# Patient Record
Sex: FEMALE | Race: WHITE | NOT HISPANIC OR LATINO | Employment: FULL TIME | URBAN - METROPOLITAN AREA
[De-identification: names, ages, dates, MRNs, and addresses within clinical notes are randomized per-mention and may not be internally consistent; named-entity substitution may affect disease eponyms.]

---

## 2019-04-05 ENCOUNTER — HOSPITAL ENCOUNTER (EMERGENCY)
Facility: HOSPITAL | Age: 19
Discharge: HOME | End: 2019-04-05
Attending: EMERGENCY MEDICINE
Payer: COMMERCIAL

## 2019-04-05 VITALS
BODY MASS INDEX: 19.16 KG/M2 | WEIGHT: 115 LBS | TEMPERATURE: 98 F | DIASTOLIC BLOOD PRESSURE: 79 MMHG | HEIGHT: 65 IN | OXYGEN SATURATION: 100 % | HEART RATE: 88 BPM | SYSTOLIC BLOOD PRESSURE: 130 MMHG | RESPIRATION RATE: 20 BRPM

## 2019-04-05 DIAGNOSIS — R11.2 NON-INTRACTABLE VOMITING WITH NAUSEA, UNSPECIFIED VOMITING TYPE: Primary | ICD-10-CM

## 2019-04-05 DIAGNOSIS — R10.84 GENERALIZED ABDOMINAL PAIN: ICD-10-CM

## 2019-04-05 LAB
ALBUMIN SERPL BCP-MCNC: 4.3 G/DL (ref 3.5–5.2)
ALP SERPL-CCNC: 45 U/L (ref 55–135)
ALT SERPL W/O P-5'-P-CCNC: 17 U/L (ref 10–44)
AMPHET+METHAMPHET UR QL: NEGATIVE
ANION GAP SERPL CALC-SCNC: 14 MMOL/L (ref 8–16)
AST SERPL-CCNC: 25 U/L (ref 10–40)
B-HCG UR QL: NEGATIVE
BACTERIA #/AREA URNS HPF: ABNORMAL /HPF
BARBITURATES UR QL SCN>200 NG/ML: NEGATIVE
BASOPHILS # BLD AUTO: 0.01 K/UL (ref 0–0.2)
BASOPHILS NFR BLD: 0.1 % (ref 0–1.9)
BENZODIAZ UR QL SCN>200 NG/ML: NEGATIVE
BILIRUB SERPL-MCNC: 0.7 MG/DL (ref 0.1–1)
BILIRUB UR QL STRIP: NEGATIVE
BUN SERPL-MCNC: 8 MG/DL (ref 6–20)
BZE UR QL SCN: NEGATIVE
CALCIUM SERPL-MCNC: 9.8 MG/DL (ref 8.7–10.5)
CANNABINOIDS UR QL SCN: NEGATIVE
CHLORIDE SERPL-SCNC: 103 MMOL/L (ref 95–110)
CLARITY UR: ABNORMAL
CO2 SERPL-SCNC: 20 MMOL/L (ref 23–29)
COLOR UR: YELLOW
CREAT SERPL-MCNC: 0.8 MG/DL (ref 0.5–1.4)
CREAT UR-MCNC: 175.9 MG/DL (ref 15–325)
CTP QC/QA: YES
DIFFERENTIAL METHOD: ABNORMAL
EOSINOPHIL # BLD AUTO: 0 K/UL (ref 0–0.5)
EOSINOPHIL NFR BLD: 0 % (ref 0–8)
ERYTHROCYTE [DISTWIDTH] IN BLOOD BY AUTOMATED COUNT: 11.7 % (ref 11.5–14.5)
EST. GFR  (AFRICAN AMERICAN): >60 ML/MIN/1.73 M^2
EST. GFR  (NON AFRICAN AMERICAN): >60 ML/MIN/1.73 M^2
GLUCOSE SERPL-MCNC: 131 MG/DL (ref 70–110)
GLUCOSE UR QL STRIP: NEGATIVE
HCT VFR BLD AUTO: 39.3 % (ref 37–48.5)
HGB BLD-MCNC: 13.4 G/DL (ref 12–16)
HGB UR QL STRIP: ABNORMAL
HYALINE CASTS #/AREA URNS LPF: 0 /LPF
KETONES UR QL STRIP: ABNORMAL
LEUKOCYTE ESTERASE UR QL STRIP: NEGATIVE
LIPASE SERPL-CCNC: 13 U/L (ref 4–60)
LYMPHOCYTES # BLD AUTO: 0.7 K/UL (ref 1–4.8)
LYMPHOCYTES NFR BLD: 6.2 % (ref 18–48)
MCH RBC QN AUTO: 31.1 PG (ref 27–31)
MCHC RBC AUTO-ENTMCNC: 34.1 G/DL (ref 32–36)
MCV RBC AUTO: 91 FL (ref 82–98)
METHADONE UR QL SCN>300 NG/ML: NEGATIVE
MICROSCOPIC COMMENT: ABNORMAL
MONOCYTES # BLD AUTO: 0.2 K/UL (ref 0.3–1)
MONOCYTES NFR BLD: 1.5 % (ref 4–15)
NEUTROPHILS # BLD AUTO: 10.6 K/UL (ref 1.8–7.7)
NEUTROPHILS NFR BLD: 92.1 % (ref 38–73)
NITRITE UR QL STRIP: NEGATIVE
OPIATES UR QL SCN: NEGATIVE
PCP UR QL SCN>25 NG/ML: NEGATIVE
PH UR STRIP: >8 [PH] (ref 5–8)
PLATELET # BLD AUTO: 305 K/UL (ref 150–350)
PMV BLD AUTO: 9.8 FL (ref 9.2–12.9)
POTASSIUM SERPL-SCNC: 3.4 MMOL/L (ref 3.5–5.1)
PROT SERPL-MCNC: 7.7 G/DL (ref 6–8.4)
PROT UR QL STRIP: ABNORMAL
RBC # BLD AUTO: 4.31 M/UL (ref 4–5.4)
RBC #/AREA URNS HPF: 2 /HPF (ref 0–4)
SODIUM SERPL-SCNC: 137 MMOL/L (ref 136–145)
SP GR UR STRIP: 1.02 (ref 1–1.03)
TOXICOLOGY INFORMATION: NORMAL
URN SPEC COLLECT METH UR: ABNORMAL
UROBILINOGEN UR STRIP-ACNC: NEGATIVE EU/DL
WBC # BLD AUTO: 11.5 K/UL (ref 3.9–12.7)
WBC #/AREA URNS HPF: 5 /HPF (ref 0–5)

## 2019-04-05 PROCEDURE — 25000003 PHARM REV CODE 250: Performed by: PHYSICIAN ASSISTANT

## 2019-04-05 PROCEDURE — 81025 URINE PREGNANCY TEST: CPT | Performed by: PHYSICIAN ASSISTANT

## 2019-04-05 PROCEDURE — 63600175 PHARM REV CODE 636 W HCPCS: Performed by: PHYSICIAN ASSISTANT

## 2019-04-05 PROCEDURE — 96361 HYDRATE IV INFUSION ADD-ON: CPT

## 2019-04-05 PROCEDURE — 80053 COMPREHEN METABOLIC PANEL: CPT

## 2019-04-05 PROCEDURE — 85025 COMPLETE CBC W/AUTO DIFF WBC: CPT

## 2019-04-05 PROCEDURE — C9113 INJ PANTOPRAZOLE SODIUM, VIA: HCPCS | Performed by: PHYSICIAN ASSISTANT

## 2019-04-05 PROCEDURE — 81000 URINALYSIS NONAUTO W/SCOPE: CPT | Mod: 59

## 2019-04-05 PROCEDURE — 96375 TX/PRO/DX INJ NEW DRUG ADDON: CPT

## 2019-04-05 PROCEDURE — 96365 THER/PROPH/DIAG IV INF INIT: CPT

## 2019-04-05 PROCEDURE — 80307 DRUG TEST PRSMV CHEM ANLYZR: CPT

## 2019-04-05 PROCEDURE — 83690 ASSAY OF LIPASE: CPT

## 2019-04-05 PROCEDURE — 99284 EMERGENCY DEPT VISIT MOD MDM: CPT | Mod: 25

## 2019-04-05 RX ORDER — PANTOPRAZOLE SODIUM 40 MG/10ML
40 INJECTION, POWDER, LYOPHILIZED, FOR SOLUTION INTRAVENOUS
Status: COMPLETED | OUTPATIENT
Start: 2019-04-05 | End: 2019-04-05

## 2019-04-05 RX ORDER — METOCLOPRAMIDE HYDROCHLORIDE 5 MG/ML
10 INJECTION INTRAMUSCULAR; INTRAVENOUS
Status: COMPLETED | OUTPATIENT
Start: 2019-04-05 | End: 2019-04-05

## 2019-04-05 RX ORDER — DICYCLOMINE HYDROCHLORIDE 10 MG/1
10 CAPSULE ORAL
Status: DISCONTINUED | OUTPATIENT
Start: 2019-04-05 | End: 2019-04-05

## 2019-04-05 RX ORDER — PROMETHAZINE HYDROCHLORIDE 25 MG/1
25 SUPPOSITORY RECTAL EVERY 6 HOURS PRN
Qty: 30 SUPPOSITORY | Refills: 0 | Status: ON HOLD | OUTPATIENT
Start: 2019-04-05 | End: 2019-04-09 | Stop reason: HOSPADM

## 2019-04-05 RX ORDER — PROMETHAZINE HYDROCHLORIDE 25 MG/ML
12.5 INJECTION, SOLUTION INTRAMUSCULAR; INTRAVENOUS
Status: DISCONTINUED | OUTPATIENT
Start: 2019-04-05 | End: 2019-04-05

## 2019-04-05 RX ORDER — ONDANSETRON 2 MG/ML
4 INJECTION INTRAMUSCULAR; INTRAVENOUS
Status: COMPLETED | OUTPATIENT
Start: 2019-04-05 | End: 2019-04-05

## 2019-04-05 RX ORDER — HYOSCYAMINE SULFATE 0.125 MG
0.38 TABLET ORAL
Status: COMPLETED | OUTPATIENT
Start: 2019-04-05 | End: 2019-04-05

## 2019-04-05 RX ORDER — HYOSCYAMINE SULFATE 0.5 MG/ML
0.5 INJECTION, SOLUTION SUBCUTANEOUS
Status: DISCONTINUED | OUTPATIENT
Start: 2019-04-05 | End: 2019-04-05

## 2019-04-05 RX ORDER — ONDANSETRON 4 MG/1
4 TABLET, ORALLY DISINTEGRATING ORAL EVERY 6 HOURS PRN
Qty: 30 TABLET | Refills: 0 | Status: SHIPPED | OUTPATIENT
Start: 2019-04-05

## 2019-04-05 RX ORDER — PROMETHAZINE HYDROCHLORIDE 25 MG/ML
12.5 INJECTION, SOLUTION INTRAMUSCULAR; INTRAVENOUS
Status: DISCONTINUED | OUTPATIENT
Start: 2019-04-05 | End: 2019-04-05 | Stop reason: HOSPADM

## 2019-04-05 RX ORDER — POTASSIUM CHLORIDE 20 MEQ/1
40 TABLET, EXTENDED RELEASE ORAL
Status: COMPLETED | OUTPATIENT
Start: 2019-04-05 | End: 2019-04-05

## 2019-04-05 RX ADMIN — POTASSIUM CHLORIDE 40 MEQ: 20 TABLET, EXTENDED RELEASE ORAL at 03:04

## 2019-04-05 RX ADMIN — ONDANSETRON 4 MG: 2 INJECTION INTRAMUSCULAR; INTRAVENOUS at 11:04

## 2019-04-05 RX ADMIN — SODIUM CHLORIDE 1000 ML: 0.9 INJECTION, SOLUTION INTRAVENOUS at 12:04

## 2019-04-05 RX ADMIN — SODIUM CHLORIDE 1000 ML: 0.9 INJECTION, SOLUTION INTRAVENOUS at 11:04

## 2019-04-05 RX ADMIN — PROMETHAZINE HYDROCHLORIDE 12.5 MG: 25 INJECTION INTRAMUSCULAR; INTRAVENOUS at 01:04

## 2019-04-05 RX ADMIN — PANTOPRAZOLE SODIUM 40 MG: 40 INJECTION, POWDER, LYOPHILIZED, FOR SOLUTION INTRAVENOUS at 11:04

## 2019-04-05 RX ADMIN — METOCLOPRAMIDE 10 MG: 5 INJECTION, SOLUTION INTRAMUSCULAR; INTRAVENOUS at 03:04

## 2019-04-05 RX ADMIN — HYOSCYAMINE SULFATE 0.38 MG: 0.12 TABLET ORAL at 02:04

## 2019-04-05 NOTE — ED NOTES
Pt presents to the ED w/ c/ of abd pain with n/v. Pt reports that she was admitted to the hospital 2 months ago for same symptoms. Pt reports that she has been having n/v abd pain for the past few weeks but reports the symptoms worsened last night. Pt reports intermittent nausea and epigastric pain at this time. Pt is actively vomiting at this time. Pt appears in mild distress.

## 2019-04-05 NOTE — ED PROVIDER NOTES
Encounter Date: 4/5/2019       History     Chief Complaint   Patient presents with    Emesis     intermittent n/v x 2 months. reports that she has seen GI with out resolution or dx     Niru Phillips, a 18 y.o. female  has no past medical history on file.     She presents to the ED for evaluation of N/V that started this morning.  Patient states that she's had this issue off and on for the last month.  She is from [place] and is in [facility] temporarily.  She states that she was initially seen in the ED in [place] for this issue where she did have f/u with a GI specialist.  A CT, US, endoscopy and upper GI series were performed that did not yield much results.  Patient was instructed to take Reglan, Ranitidine, and bentyl as well as an anti-anxiety medication, which she has not taken with consistency.  She denies any abdominal surgeries.  Attests to intermittent THC use.  No treatments tried with this episode of vomiting.  Denies any recent exotic travel.         The history is provided by the patient.     Review of patient's allergies indicates:  No Known Allergies  No past medical history on file.  No past surgical history on file.  No family history on file.  Social History     Tobacco Use    Smoking status: Not on file   Substance Use Topics    Alcohol use: Not on file    Drug use: Not on file     Review of Systems   Constitutional: Negative for fever.   Gastrointestinal: Positive for abdominal pain, nausea and vomiting. Negative for constipation and diarrhea.   Genitourinary: Negative for dysuria.   Skin: Negative for color change.   Neurological: Positive for weakness.   All other systems reviewed and are negative.      Physical Exam     Initial Vitals [04/05/19 0943]   BP Pulse Resp Temp SpO2   (!) 145/74 104 19 98.5 °F (36.9 °C) 100 %      MAP       --         Physical Exam    Nursing note and vitals reviewed.  Constitutional: She appears well-developed and well-nourished. She is not diaphoretic. No  distress.   HENT:   Head: Normocephalic and atraumatic.   Right Ear: External ear normal.   Left Ear: External ear normal.   Nose: Nose normal.   Mouth/Throat: Oropharynx is clear and moist.   Eyes: Conjunctivae and EOM are normal.   Neck: Normal range of motion.   Cardiovascular: Normal rate and regular rhythm.   Pulmonary/Chest: Breath sounds normal. No respiratory distress. She has no wheezes. She has no rhonchi. She has no rales.   Abdominal: Soft. Bowel sounds are normal. She exhibits no distension. There is generalized tenderness. There is no rigidity, no rebound, no guarding, no CVA tenderness, no tenderness at McBurney's point and negative King's sign.   Musculoskeletal: Normal range of motion.   Neurological: She is alert and oriented to person, place, and time.   Skin: Skin is warm and dry. Capillary refill takes less than 2 seconds.   Psychiatric: She has a normal mood and affect. Thought content normal.         ED Course   Procedures  Labs Reviewed   URINALYSIS, REFLEX TO URINE CULTURE - Abnormal; Notable for the following components:       Result Value    Appearance, UA Cloudy (*)     pH, UA >8.0 (*)     Protein, UA 2+ (*)     Ketones, UA 3+ (*)     Occult Blood UA Trace (*)     All other components within normal limits    Narrative:     Preferred Collection Type->Urine, Clean Catch   CBC W/ AUTO DIFFERENTIAL - Abnormal; Notable for the following components:    MCH 31.1 (*)     Gran # (ANC) 10.6 (*)     Lymph # 0.7 (*)     Mono # 0.2 (*)     Gran% 92.1 (*)     Lymph% 6.2 (*)     Mono% 1.5 (*)     All other components within normal limits   COMPREHENSIVE METABOLIC PANEL - Abnormal; Notable for the following components:    Potassium 3.4 (*)     CO2 20 (*)     Glucose 131 (*)     Alkaline Phosphatase 45 (*)     All other components within normal limits   URINALYSIS MICROSCOPIC - Abnormal; Notable for the following components:    Bacteria, UA Few (*)     All other components within normal limits     Narrative:     Preferred Collection Type->Urine, Clean Catch   DRUG SCREEN PANEL, URINE EMERGENCY    Narrative:     Preferred Collection Type->Urine, Clean Catch   LIPASE   POCT URINE PREGNANCY          Imaging Results    None          Medical Decision Making:   Initial Assessment:   N/V, generalized abdominal pain   Differential Diagnosis:   Dehydration, electrolyte abnormality, THC abuse, cyclical vomiting symdrome   Clinical Tests:   Lab Tests: Ordered and Reviewed  The following lab test(s) were unremarkable: CBC, CMP, Lipase and Urinalysis  ED Management:  Patient presents to the ER for evaluation of multiple episodes of vomiting as well as generalized abdominal pain.  Patient has had several episodes of this in the past and has a complete workup performed at her hospital in [place].  Abdomen shows generalized tenderness to palpation with no evidence of peritoneal signs.  No elevation of white cell count on CBC.  CMP shows potassium at 3.4.  Zofran, fluids, Phenergan were given.  Patient has not continued with vomiting with this intervention however she states she is still nauseated.  Reglan was given as well as p.o. Levsin.  Patient did past her p.o. challenge.  She will be prescribed a course of Phenergan suppositories as well as ODT Zofran.  Strict return precautions were discussed with her and her parents including the vomiting through medication, fever, increased abdominal pain.  Both patient and.  Verbalized understanding and agreement with plan.  Case discussed with supervising physician who agrees with plan.                        Clinical Impression:       ICD-10-CM ICD-9-CM   1. Non-intractable vomiting with nausea, unspecified vomiting type R11.2 787.01   2. Generalized abdominal pain R10.84 789.07                                Selina Lema PA-C  04/05/19 6290

## 2019-04-05 NOTE — ED NOTES
"Pt states "ate a little piece of roxie cracker." pt did not take PO potassium pills. RN encouraged to try PO challenge. Will continue to monitor  "

## 2019-04-06 ENCOUNTER — HOSPITAL ENCOUNTER (OUTPATIENT)
Facility: HOSPITAL | Age: 19
Discharge: HOME | End: 2019-04-09
Attending: EMERGENCY MEDICINE | Admitting: INTERNAL MEDICINE
Payer: COMMERCIAL

## 2019-04-06 DIAGNOSIS — R11.2 INTRACTABLE NAUSEA AND VOMITING: Primary | ICD-10-CM

## 2019-04-06 DIAGNOSIS — Z91.89 AT RISK FOR PROLONGED QT INTERVAL SYNDROME: ICD-10-CM

## 2019-04-06 DIAGNOSIS — E87.6 HYPOKALEMIA: ICD-10-CM

## 2019-04-06 DIAGNOSIS — R10.9 ABDOMINAL PAIN, UNSPECIFIED ABDOMINAL LOCATION: ICD-10-CM

## 2019-04-06 LAB
ALBUMIN SERPL BCP-MCNC: 4.3 G/DL (ref 3.5–5.2)
ALP SERPL-CCNC: 42 U/L (ref 55–135)
ALT SERPL W/O P-5'-P-CCNC: 20 U/L (ref 10–44)
AMPHET+METHAMPHET UR QL: NEGATIVE
ANION GAP SERPL CALC-SCNC: 15 MMOL/L (ref 8–16)
AST SERPL-CCNC: 26 U/L (ref 10–40)
B-HCG UR QL: NEGATIVE
BACTERIA #/AREA URNS AUTO: NORMAL /HPF
BARBITURATES UR QL SCN>200 NG/ML: NEGATIVE
BASOPHILS # BLD AUTO: 0.01 K/UL (ref 0–0.2)
BASOPHILS NFR BLD: 0.1 % (ref 0–1.9)
BENZODIAZ UR QL SCN>200 NG/ML: NEGATIVE
BILIRUB SERPL-MCNC: 0.8 MG/DL (ref 0.1–1)
BILIRUB UR QL STRIP: NEGATIVE
BUN SERPL-MCNC: 8 MG/DL (ref 6–20)
BZE UR QL SCN: NEGATIVE
CALCIUM SERPL-MCNC: 9.6 MG/DL (ref 8.7–10.5)
CANNABINOIDS UR QL SCN: NEGATIVE
CHLORIDE SERPL-SCNC: 104 MMOL/L (ref 95–110)
CLARITY UR REFRACT.AUTO: ABNORMAL
CO2 SERPL-SCNC: 19 MMOL/L (ref 23–29)
COLOR UR AUTO: YELLOW
CREAT SERPL-MCNC: 0.9 MG/DL (ref 0.5–1.4)
CREAT UR-MCNC: 127 MG/DL (ref 15–325)
CRP SERPL-MCNC: 1.2 MG/L (ref 0–8.2)
CTP QC/QA: YES
DIFFERENTIAL METHOD: ABNORMAL
EOSINOPHIL # BLD AUTO: 0 K/UL (ref 0–0.5)
EOSINOPHIL NFR BLD: 0 % (ref 0–8)
ERYTHROCYTE [DISTWIDTH] IN BLOOD BY AUTOMATED COUNT: 11.8 % (ref 11.5–14.5)
ERYTHROCYTE [SEDIMENTATION RATE] IN BLOOD BY WESTERGREN METHOD: 15 MM/HR (ref 0–36)
EST. GFR  (AFRICAN AMERICAN): >60 ML/MIN/1.73 M^2
EST. GFR  (NON AFRICAN AMERICAN): >60 ML/MIN/1.73 M^2
GLUCOSE SERPL-MCNC: 107 MG/DL (ref 70–110)
GLUCOSE UR QL STRIP: NEGATIVE
HCT VFR BLD AUTO: 39.9 % (ref 37–48.5)
HGB BLD-MCNC: 13.6 G/DL (ref 12–16)
HGB UR QL STRIP: ABNORMAL
HYALINE CASTS UR QL AUTO: 0 /LPF
IMM GRANULOCYTES # BLD AUTO: 0.04 K/UL (ref 0–0.04)
IMM GRANULOCYTES NFR BLD AUTO: 0.3 % (ref 0–0.5)
KETONES UR QL STRIP: ABNORMAL
LEUKOCYTE ESTERASE UR QL STRIP: NEGATIVE
LIPASE SERPL-CCNC: 26 U/L (ref 4–60)
LYMPHOCYTES # BLD AUTO: 1.3 K/UL (ref 1–4.8)
LYMPHOCYTES NFR BLD: 9.4 % (ref 18–48)
MCH RBC QN AUTO: 31.7 PG (ref 27–31)
MCHC RBC AUTO-ENTMCNC: 34.1 G/DL (ref 32–36)
MCV RBC AUTO: 93 FL (ref 82–98)
METHADONE UR QL SCN>300 NG/ML: NEGATIVE
MICROSCOPIC COMMENT: NORMAL
MONOCYTES # BLD AUTO: 0.4 K/UL (ref 0.3–1)
MONOCYTES NFR BLD: 3.1 % (ref 4–15)
NEUTROPHILS # BLD AUTO: 12.4 K/UL (ref 1.8–7.7)
NEUTROPHILS NFR BLD: 87.1 % (ref 38–73)
NITRITE UR QL STRIP: NEGATIVE
NRBC BLD-RTO: 0 /100 WBC
OPIATES UR QL SCN: NEGATIVE
PCP UR QL SCN>25 NG/ML: NEGATIVE
PH UR STRIP: 7 [PH] (ref 5–8)
PLATELET # BLD AUTO: 330 K/UL (ref 150–350)
PMV BLD AUTO: 9.8 FL (ref 9.2–12.9)
POTASSIUM SERPL-SCNC: 3.3 MMOL/L (ref 3.5–5.1)
PROT SERPL-MCNC: 7.5 G/DL (ref 6–8.4)
PROT UR QL STRIP: ABNORMAL
RBC # BLD AUTO: 4.29 M/UL (ref 4–5.4)
RBC #/AREA URNS AUTO: 3 /HPF (ref 0–4)
SODIUM SERPL-SCNC: 138 MMOL/L (ref 136–145)
SP GR UR STRIP: 1.01 (ref 1–1.03)
SQUAMOUS #/AREA URNS AUTO: 5 /HPF
TOXICOLOGY INFORMATION: NORMAL
URN SPEC COLLECT METH UR: ABNORMAL
WBC # BLD AUTO: 14.27 K/UL (ref 3.9–12.7)
WBC #/AREA URNS AUTO: 1 /HPF (ref 0–5)

## 2019-04-06 PROCEDURE — 80307 DRUG TEST PRSMV CHEM ANLYZR: CPT

## 2019-04-06 PROCEDURE — 99220 PR INITIAL OBSERVATION CARE,LEVL III: ICD-10-PCS | Mod: ,,, | Performed by: PHYSICIAN ASSISTANT

## 2019-04-06 PROCEDURE — S0028 INJECTION, FAMOTIDINE, 20 MG: HCPCS | Performed by: PHYSICIAN ASSISTANT

## 2019-04-06 PROCEDURE — 96361 HYDRATE IV INFUSION ADD-ON: CPT

## 2019-04-06 PROCEDURE — 99285 EMERGENCY DEPT VISIT HI MDM: CPT | Mod: ,,, | Performed by: PHYSICIAN ASSISTANT

## 2019-04-06 PROCEDURE — 25000003 PHARM REV CODE 250: Performed by: PHYSICIAN ASSISTANT

## 2019-04-06 PROCEDURE — 81025 URINE PREGNANCY TEST: CPT | Performed by: PHYSICIAN ASSISTANT

## 2019-04-06 PROCEDURE — 99285 EMERGENCY DEPT VISIT HI MDM: CPT

## 2019-04-06 PROCEDURE — G0378 HOSPITAL OBSERVATION PER HR: HCPCS

## 2019-04-06 PROCEDURE — 99285 PR EMERGENCY DEPT VISIT,LEVEL V: ICD-10-PCS | Mod: ,,, | Performed by: PHYSICIAN ASSISTANT

## 2019-04-06 PROCEDURE — 83690 ASSAY OF LIPASE: CPT

## 2019-04-06 PROCEDURE — 80053 COMPREHEN METABOLIC PANEL: CPT

## 2019-04-06 PROCEDURE — 86140 C-REACTIVE PROTEIN: CPT

## 2019-04-06 PROCEDURE — 96365 THER/PROPH/DIAG IV INF INIT: CPT

## 2019-04-06 PROCEDURE — 85652 RBC SED RATE AUTOMATED: CPT

## 2019-04-06 PROCEDURE — 99220 PR INITIAL OBSERVATION CARE,LEVL III: CPT | Mod: ,,, | Performed by: PHYSICIAN ASSISTANT

## 2019-04-06 PROCEDURE — 81001 URINALYSIS AUTO W/SCOPE: CPT

## 2019-04-06 PROCEDURE — 85025 COMPLETE CBC W/AUTO DIFF WBC: CPT

## 2019-04-06 PROCEDURE — 63600175 PHARM REV CODE 636 W HCPCS: Performed by: PHYSICIAN ASSISTANT

## 2019-04-06 PROCEDURE — 96375 TX/PRO/DX INJ NEW DRUG ADDON: CPT

## 2019-04-06 RX ORDER — ONDANSETRON 2 MG/ML
4 INJECTION INTRAMUSCULAR; INTRAVENOUS
Status: COMPLETED | OUTPATIENT
Start: 2019-04-06 | End: 2019-04-06

## 2019-04-06 RX ORDER — CALCIUM CARBONATE 200(500)MG
1000 TABLET,CHEWABLE ORAL 3 TIMES DAILY PRN
Status: DISCONTINUED | OUTPATIENT
Start: 2019-04-06 | End: 2019-04-09 | Stop reason: HOSPADM

## 2019-04-06 RX ORDER — ONDANSETRON 8 MG/1
8 TABLET, ORALLY DISINTEGRATING ORAL EVERY 8 HOURS PRN
Status: DISCONTINUED | OUTPATIENT
Start: 2019-04-06 | End: 2019-04-09 | Stop reason: HOSPADM

## 2019-04-06 RX ORDER — DICYCLOMINE HYDROCHLORIDE 10 MG/1
20 CAPSULE ORAL
Status: DISPENSED | OUTPATIENT
Start: 2019-04-06 | End: 2019-04-07

## 2019-04-06 RX ORDER — SODIUM CHLORIDE 0.9 % (FLUSH) 0.9 %
10 SYRINGE (ML) INJECTION
Status: DISCONTINUED | OUTPATIENT
Start: 2019-04-06 | End: 2019-04-09 | Stop reason: HOSPADM

## 2019-04-06 RX ORDER — DEXTROSE MONOHYDRATE, SODIUM CHLORIDE, AND POTASSIUM CHLORIDE 50; 2.98; 4.5 G/1000ML; G/1000ML; G/1000ML
INJECTION, SOLUTION INTRAVENOUS CONTINUOUS
Status: DISPENSED | OUTPATIENT
Start: 2019-04-06 | End: 2019-04-07

## 2019-04-06 RX ORDER — BISACODYL 10 MG
10 SUPPOSITORY, RECTAL RECTAL DAILY PRN
Status: DISCONTINUED | OUTPATIENT
Start: 2019-04-06 | End: 2019-04-09 | Stop reason: HOSPADM

## 2019-04-06 RX ORDER — SODIUM CHLORIDE 0.9 % (FLUSH) 0.9 %
5 SYRINGE (ML) INJECTION
Status: DISCONTINUED | OUTPATIENT
Start: 2019-04-06 | End: 2019-04-09 | Stop reason: HOSPADM

## 2019-04-06 RX ORDER — KETOROLAC TROMETHAMINE 30 MG/ML
10 INJECTION, SOLUTION INTRAMUSCULAR; INTRAVENOUS
Status: COMPLETED | OUTPATIENT
Start: 2019-04-06 | End: 2019-04-06

## 2019-04-06 RX ORDER — IPRATROPIUM BROMIDE AND ALBUTEROL SULFATE 2.5; .5 MG/3ML; MG/3ML
3 SOLUTION RESPIRATORY (INHALATION) EVERY 4 HOURS PRN
Status: DISCONTINUED | OUTPATIENT
Start: 2019-04-06 | End: 2019-04-09 | Stop reason: HOSPADM

## 2019-04-06 RX ORDER — GLUCAGON 1 MG
1 KIT INJECTION
Status: DISCONTINUED | OUTPATIENT
Start: 2019-04-06 | End: 2019-04-09 | Stop reason: HOSPADM

## 2019-04-06 RX ORDER — RAMELTEON 8 MG/1
8 TABLET ORAL NIGHTLY PRN
Status: DISCONTINUED | OUTPATIENT
Start: 2019-04-06 | End: 2019-04-09 | Stop reason: HOSPADM

## 2019-04-06 RX ORDER — POLYETHYLENE GLYCOL 3350 17 G/17G
17 POWDER, FOR SOLUTION ORAL DAILY
Status: DISCONTINUED | OUTPATIENT
Start: 2019-04-07 | End: 2019-04-07

## 2019-04-06 RX ORDER — IBUPROFEN 200 MG
16 TABLET ORAL
Status: DISCONTINUED | OUTPATIENT
Start: 2019-04-06 | End: 2019-04-09 | Stop reason: HOSPADM

## 2019-04-06 RX ORDER — PANTOPRAZOLE SODIUM 40 MG/1
40 TABLET, DELAYED RELEASE ORAL DAILY
Status: DISCONTINUED | OUTPATIENT
Start: 2019-04-07 | End: 2019-04-09 | Stop reason: HOSPADM

## 2019-04-06 RX ORDER — METOCLOPRAMIDE HYDROCHLORIDE 5 MG/5ML
10 SOLUTION ORAL
Status: DISCONTINUED | OUTPATIENT
Start: 2019-04-06 | End: 2019-04-09 | Stop reason: HOSPADM

## 2019-04-06 RX ORDER — IBUPROFEN 200 MG
24 TABLET ORAL
Status: DISCONTINUED | OUTPATIENT
Start: 2019-04-06 | End: 2019-04-09 | Stop reason: HOSPADM

## 2019-04-06 RX ORDER — ACETAMINOPHEN 325 MG/1
650 TABLET ORAL EVERY 4 HOURS PRN
Status: DISCONTINUED | OUTPATIENT
Start: 2019-04-06 | End: 2019-04-09 | Stop reason: HOSPADM

## 2019-04-06 RX ORDER — FAMOTIDINE 10 MG/ML
20 INJECTION INTRAVENOUS
Status: COMPLETED | OUTPATIENT
Start: 2019-04-06 | End: 2019-04-06

## 2019-04-06 RX ORDER — KETOROLAC TROMETHAMINE 30 MG/ML
15 INJECTION, SOLUTION INTRAMUSCULAR; INTRAVENOUS EVERY 6 HOURS PRN
Status: DISCONTINUED | OUTPATIENT
Start: 2019-04-06 | End: 2019-04-08

## 2019-04-06 RX ADMIN — SODIUM CHLORIDE 1000 ML: 0.9 INJECTION, SOLUTION INTRAVENOUS at 02:04

## 2019-04-06 RX ADMIN — SODIUM CHLORIDE 1000 ML: 0.9 INJECTION, SOLUTION INTRAVENOUS at 03:04

## 2019-04-06 RX ADMIN — PROMETHAZINE HYDROCHLORIDE 12.5 MG: 25 INJECTION INTRAMUSCULAR; INTRAVENOUS at 05:04

## 2019-04-06 RX ADMIN — KETOROLAC TROMETHAMINE 10 MG: 30 INJECTION, SOLUTION INTRAMUSCULAR at 03:04

## 2019-04-06 RX ADMIN — FAMOTIDINE 20 MG: 10 INJECTION, SOLUTION INTRAVENOUS at 04:04

## 2019-04-06 RX ADMIN — ONDANSETRON 4 MG: 2 INJECTION INTRAMUSCULAR; INTRAVENOUS at 03:04

## 2019-04-06 RX ADMIN — METOCLOPRAMIDE HYDROCHLORIDE 10 MG: 5 SOLUTION ORAL at 10:04

## 2019-04-06 RX ADMIN — POTASSIUM CHLORIDE, DEXTROSE MONOHYDRATE AND SODIUM CHLORIDE: 300; 5; 450 INJECTION, SOLUTION INTRAVENOUS at 10:04

## 2019-04-06 NOTE — ED TRIAGE NOTES
N/Vx 2 days pt was scene at [facility] yesterday. Pt c\o upper abd pain. And d/c with phenergan suppositories with no relief.

## 2019-04-06 NOTE — ED NOTES
Niru Phillips, a 18 y.o. female presents to the ED via pmv with CC N/V      Patient identifiers verified verbally with armband and correct for Niru Phillips.    LOC/ APPEARANCE: The patient is AAOx4. Pt is speaking appropriately, no slurred speech. Pt changed into hospital gown. Continuous cardiac monitor, cont pulse ox, and auto BP cuff applied to patient. Pt is clean and well groomed. No JVD visible. Pt reports pain level of 0. Pt updated on POC. Bed low and locked with side rails up x2, call bell in pt reach.  SKIN: Skin is warm dry and intact, and color is consistent with ethnicity. Capillary refill <3 seconds. No breakdown or brusing visible. Mucus membranes moist, acyanotic.  RESPIRATORY: Airway is open and patent. Respirations-spontaneous, unlabored, regular rate, equal bilaterally on inspiration and expiration. No accessory muscle use noted. Lungs clear to auscultation in all fields bilaterally anterior and posterior.   CARDIAC: Patient has regular heart rate.  No peripheral edema noted, and patient has no c/o chest pain. Peripheral pulses present equal and strong throughout.  ABDOMEN:N/V x 3 days Soft and non-tender to palpation with no distention noted. Normoactive bowel sounds x4 quadrants. Pt has no complaints of abnormal bowel movements, denies blood in stool. Pt reports normal appetite.   NEUROLOGIC: Eyes open spontaneously and facial expression symmetrical. Pt behavior appropriate to situation, and pt follows commands. Pt reports sensation present in all extremities when touched with a finger, denies any numbness or tingling. PERRLA  MUSCULOSKELETAL: Spontaneous movement noted to all extremities. Hand  equal and leg strength strong +5 bilaterally.   : No complaints of frequency, burning, urgency or blood in the urine. No complaints of incontinence.

## 2019-04-06 NOTE — ED PROVIDER NOTES
Encounter Date: 4/6/2019    SCRIBE #1 NOTE: I, Raji Rosario, am scribing for, and in the presence of,  Dr. Alvares. I have scribed the following portions of the note - the APC attestation.       History     Chief Complaint   Patient presents with    Abdominal Pain     vomiting seen at [facility] with same thing,      Patient is a 18-year-old female presenting to the ER with her parents for evaluation of abdominal pain. Patient states that most recent symptoms started on Thursday with epigastric right upper quadrant abdominal pain and associated nausea vomiting. Patient was seen at [facility] yesterday and was prescribed home on Phenergan suppository and Zofran for nausea with no relief.  She has had multiple episodes this morning.  No prior abdominal surgeries.  Denies any UTI symptoms with dysuria hematuria.  No fevers or chills at home.  She did have episodes of diarrhea which has now subsided.  No sick contact.  No changes in diet or medication.  No recent antibiotic use.    Patient states that a few months ago she had diffuse abdominal pain and had imaging including CT, ultrasound and endoscopy by GI specialist in [place] with no final diagnosis.  Patient here with family for a wedding.  She does have an appointment with GI specialist on Tuesday.  Patient is also on Bentyl and Reglan at home.    The history is provided by the patient.     Review of patient's allergies indicates:  No Known Allergies  No past medical history on file.  No past surgical history on file.  No family history on file.  Social History     Tobacco Use    Smoking status: Never Smoker   Substance Use Topics    Alcohol use: Yes     Comment: occasional     Drug use: Not on file     Review of Systems   Constitutional: Negative for chills and fever.   HENT: Negative for congestion.    Respiratory: Negative for cough and shortness of breath.    Cardiovascular: Negative for chest pain.   Gastrointestinal: Positive for abdominal pain,  nausea and vomiting. Negative for constipation and diarrhea.   Genitourinary: Negative for dysuria and flank pain.   Musculoskeletal: Negative for myalgias.   Skin: Negative for rash.   Allergic/Immunologic: Negative for immunocompromised state.   Neurological: Negative for weakness.   Hematological: Does not bruise/bleed easily.   Psychiatric/Behavioral: Negative for confusion.       Physical Exam     Initial Vitals [04/06/19 1344]   BP Pulse Resp Temp SpO2   (!) 144/84 98 18 98.8 °F (37.1 °C) 100 %      MAP       --         Physical Exam    Vitals reviewed.  Constitutional: She appears well-developed and well-nourished. She is not diaphoretic. No distress.   HENT:   Head: Normocephalic and atraumatic.   Mouth/Throat: Oropharynx is clear and moist.   Eyes: Conjunctivae and EOM are normal.   Neck: Neck supple.   Cardiovascular: Normal rate, regular rhythm and normal heart sounds.   Pulmonary/Chest: Breath sounds normal.   Abdominal: Soft. Normal appearance. She exhibits no distension. There is tenderness in the right upper quadrant and epigastric area. There is no rigidity, no rebound, no guarding and no CVA tenderness.   Neurological: She is alert and oriented to person, place, and time.   Skin: Skin is warm and dry.         ED Course   Procedures  Labs Reviewed   CBC W/ AUTO DIFFERENTIAL - Abnormal; Notable for the following components:       Result Value    WBC 14.27 (*)     MCH 31.7 (*)     Gran # (ANC) 12.4 (*)     Gran% 87.1 (*)     Lymph% 9.4 (*)     Mono% 3.1 (*)     All other components within normal limits   COMPREHENSIVE METABOLIC PANEL - Abnormal; Notable for the following components:    Potassium 3.3 (*)     CO2 19 (*)     Alkaline Phosphatase 42 (*)     All other components within normal limits   URINALYSIS, REFLEX TO URINE CULTURE - Abnormal; Notable for the following components:    Appearance, UA Hazy (*)     Protein, UA 1+ (*)     Ketones, UA 3+ (*)     Occult Blood UA 1+ (*)     All other  components within normal limits    Narrative:     Preferred Collection Type->Urine, Clean Catch  yellow and gray tube   LIPASE   URINALYSIS MICROSCOPIC    Narrative:     Preferred Collection Type->Urine, Clean Catch  yellow and gray tube   DRUG SCREEN PANEL, URINE EMERGENCY   DRUG SCREEN PANEL, URINE EMERGENCY    Narrative:     Preferred Collection Type->Urine, Clean Catch  yellow and gray tube  add on [facility] #[id number] per Viji Mayberry PA-C @ 15:52    04/06/2019    SEDIMENTATION RATE   C-REACTIVE PROTEIN   POCT URINE PREGNANCY          Imaging Results          US Abdomen Limited (Final result)  Result time 04/06/19 16:27:22    Final result by Armando Giron MD (04/06/19 16:27:22)                 Impression:      No acute sonographic findings.    Electronically signed by resident: Saul Bates  Date:    04/06/2019  Time:    16:10    Electronically signed by: Armando Giron MD  Date:    04/06/2019  Time:    16:27             Narrative:    EXAMINATION:  US ABDOMEN LIMITED    CLINICAL HISTORY:  ruq pain;.    TECHNIQUE:  Limited ultrasound of the right upper quadrant of the abdomen including pancreas, liver, gallbladder, common bile duct was performed.    COMPARISON:  None.    FINDINGS:  Liver: Upper limit of normal size, measuring 17.2 cm. Homogeneous echotexture. No focal hepatic lesions.    Gallbladder: No calculi, wall thickening, or pericholecystic fluid.  No sonographic King's sign.    Biliary system: The common duct is not dilated, measuring 3 mm.  No intrahepatic ductal dilatation.    Spleen: Normal in size with a homogeneous echotexture, measuring 9.0 x 4.2 cm.    Pancreas: The visualized portions of pancreas appear normal.    Miscellaneous: No ascites.  Visualized IVC is unremarkable.  Stomach is distended with fluid and scattered echogenic debris which is nonspecific.  Right kidney is not seen compatible with congenital absence per patient report.  Visualized left kidney is unremarkable.                                  Medical Decision Making:   History:   Old Medical Records: I decided to obtain old medical records.  Clinical Tests:   Lab Tests: Reviewed and Ordered  Radiological Study: Reviewed and Ordered       APC / Resident Notes:   Patient in the ER promptly upon arrival.  She is afebrile, no acute distress. She has tenderness on palpation to the right upper quadrant epigastric region.  Abdomen soft, nondistended, no CVA tenderness. IV access established, labs ordered, fluids given.  Patient was given Zofran for nausea.    Laboratory studies show leukocytosis of 14.2.  Hemoglobin stable.  Chemistries were found to be fairly unremarkable.  Urinalysis does not show evidence of infection or blood.  There is 3+ ketones likely secondary to dehydration.  Pregnancy test negative. Urine drug screen negative. Ultrasound of right upper quadrant unremarkable for evidence of acute pathology.    Upon reassessment patient continues to have pain. Patient is requiring multiple doses of antiemetics and pain medication.  Patient not able to tolerate oral intake at this time.    Offered CT abdomen given persistent pain and nausea.  The patient and parents wanted to hold off on CT scanning at this time as she had recent scans earlier this year.    Discussed case with Hospital Medicine for admission for further management of intractable nausea and vomiting. Patient did not feel that she could be discharged home today.  She has been stable during her stay in the ED and stable for transfer to the floor at this time.       Scribe Attestation:   Scribe #1: I performed the above scribed service and the documentation accurately describes the services I performed. I attest to the accuracy of the note.    Attending Attestation:     Physician Attestation Statement for NP/PA:   I have conducted a face to face encounter with this patient in addition to the NP/PA, due to Medical Complexity    Other NP/PA Attestation Additions:     History of Present Illness: I received care of this patient upon signout from Dr. Alvares: 18 year old female presents to the ED  for evaluation of abdominal pain with persistent nausea and vomiting. Pt has h/o N/V for which she has previously been evaluated with neg CT, seen at [facility] yesterday for similar symptoms but no relief from Phenergan.   Physical Exam: Appears uncomfortable, thin, NCAT, A&Ox3, mm dry, PERRL and EOMI, neck supple/normal ROM, CTAB, RRR no mrg, normal extremity ROM/m/s, abd soft but diffusely mildly TTP w/o rebound (worse in epigastrium), no CVAT, no LE edema, skin dry and warm   Medical Decision Making: No focal TTP concerning for appy or pelvic disease, pt declining CT at this time, given intractable nausea/vomiting will admit pt.    Attending Note:  Physician Attestation Statement: I have personally seen and examined this patient. As the supervising MD I agree with the above history. As the supervising MD I agree with the above PE. As the supervising MD I agree with the above treatment, course, plan, and disposition.                       Clinical Impression:       ICD-10-CM ICD-9-CM   1. Intractable nausea and vomiting R11.2 536.2   2. Abdominal pain, unspecified abdominal location R10.9 789.00         Disposition:   Disposition: Placed in Observation  Condition: Stable                        Viji Martinez PA-C  04/06/19 1929       Coleen Arreola MD  04/15/19 0923

## 2019-04-07 LAB
ANION GAP SERPL CALC-SCNC: 3 MMOL/L (ref 8–16)
BASOPHILS # BLD AUTO: 0.03 K/UL (ref 0–0.2)
BASOPHILS NFR BLD: 0.3 % (ref 0–1.9)
BUN SERPL-MCNC: 7 MG/DL (ref 6–20)
CALCIUM SERPL-MCNC: 8.5 MG/DL (ref 8.7–10.5)
CHLORIDE SERPL-SCNC: 109 MMOL/L (ref 95–110)
CO2 SERPL-SCNC: 25 MMOL/L (ref 23–29)
CREAT SERPL-MCNC: 0.7 MG/DL (ref 0.5–1.4)
DIFFERENTIAL METHOD: ABNORMAL
EOSINOPHIL # BLD AUTO: 0 K/UL (ref 0–0.5)
EOSINOPHIL NFR BLD: 0.1 % (ref 0–8)
ERYTHROCYTE [DISTWIDTH] IN BLOOD BY AUTOMATED COUNT: 11.7 % (ref 11.5–14.5)
EST. GFR  (AFRICAN AMERICAN): >60 ML/MIN/1.73 M^2
EST. GFR  (NON AFRICAN AMERICAN): >60 ML/MIN/1.73 M^2
ESTIMATED AVG GLUCOSE: 85 MG/DL (ref 68–131)
GLUCOSE SERPL-MCNC: 105 MG/DL (ref 70–110)
HBA1C MFR BLD HPLC: 4.6 % (ref 4–5.6)
HCT VFR BLD AUTO: 34.2 % (ref 37–48.5)
HGB BLD-MCNC: 11.5 G/DL (ref 12–16)
IMM GRANULOCYTES # BLD AUTO: 0.03 K/UL (ref 0–0.04)
IMM GRANULOCYTES NFR BLD AUTO: 0.3 % (ref 0–0.5)
LYMPHOCYTES # BLD AUTO: 2.8 K/UL (ref 1–4.8)
LYMPHOCYTES NFR BLD: 30.3 % (ref 18–48)
MAGNESIUM SERPL-MCNC: 2 MG/DL (ref 1.6–2.6)
MCH RBC QN AUTO: 31.3 PG (ref 27–31)
MCHC RBC AUTO-ENTMCNC: 33.6 G/DL (ref 32–36)
MCV RBC AUTO: 93 FL (ref 82–98)
MONOCYTES # BLD AUTO: 0.8 K/UL (ref 0.3–1)
MONOCYTES NFR BLD: 8.3 % (ref 4–15)
NEUTROPHILS # BLD AUTO: 5.6 K/UL (ref 1.8–7.7)
NEUTROPHILS NFR BLD: 60.7 % (ref 38–73)
NRBC BLD-RTO: 0 /100 WBC
PHOSPHATE SERPL-MCNC: 2.8 MG/DL (ref 2.7–4.5)
PLATELET # BLD AUTO: 246 K/UL (ref 150–350)
PMV BLD AUTO: 9.8 FL (ref 9.2–12.9)
POTASSIUM SERPL-SCNC: 3.9 MMOL/L (ref 3.5–5.1)
RBC # BLD AUTO: 3.67 M/UL (ref 4–5.4)
SODIUM SERPL-SCNC: 137 MMOL/L (ref 136–145)
WBC # BLD AUTO: 9.18 K/UL (ref 3.9–12.7)

## 2019-04-07 PROCEDURE — G0378 HOSPITAL OBSERVATION PER HR: HCPCS

## 2019-04-07 PROCEDURE — 85025 COMPLETE CBC W/AUTO DIFF WBC: CPT

## 2019-04-07 PROCEDURE — 84100 ASSAY OF PHOSPHORUS: CPT

## 2019-04-07 PROCEDURE — 80048 BASIC METABOLIC PNL TOTAL CA: CPT

## 2019-04-07 PROCEDURE — 93010 ELECTROCARDIOGRAM REPORT: CPT | Mod: ,,, | Performed by: PEDIATRICS

## 2019-04-07 PROCEDURE — 25000003 PHARM REV CODE 250: Performed by: PHYSICIAN ASSISTANT

## 2019-04-07 PROCEDURE — 99226 PR SUBSEQUENT OBSERVATION CARE,LEVEL III: ICD-10-PCS | Mod: ,,, | Performed by: PHYSICIAN ASSISTANT

## 2019-04-07 PROCEDURE — 63600175 PHARM REV CODE 636 W HCPCS: Performed by: PHYSICIAN ASSISTANT

## 2019-04-07 PROCEDURE — 93005 ELECTROCARDIOGRAM TRACING: CPT

## 2019-04-07 PROCEDURE — 36415 COLL VENOUS BLD VENIPUNCTURE: CPT

## 2019-04-07 PROCEDURE — 83036 HEMOGLOBIN GLYCOSYLATED A1C: CPT

## 2019-04-07 PROCEDURE — 83735 ASSAY OF MAGNESIUM: CPT

## 2019-04-07 PROCEDURE — 99226 PR SUBSEQUENT OBSERVATION CARE,LEVEL III: CPT | Mod: ,,, | Performed by: PHYSICIAN ASSISTANT

## 2019-04-07 PROCEDURE — 93010 EKG 12-LEAD: ICD-10-PCS | Mod: ,,, | Performed by: PEDIATRICS

## 2019-04-07 RX ORDER — PANTOPRAZOLE SODIUM 40 MG/1
40 TABLET, DELAYED RELEASE ORAL DAILY
Qty: 30 TABLET | Refills: 11 | Status: SHIPPED | OUTPATIENT
Start: 2019-04-08 | End: 2020-04-07

## 2019-04-07 RX ORDER — ONDANSETRON 2 MG/ML
4 INJECTION INTRAMUSCULAR; INTRAVENOUS EVERY 6 HOURS
Status: DISCONTINUED | OUTPATIENT
Start: 2019-04-07 | End: 2019-04-09 | Stop reason: HOSPADM

## 2019-04-07 RX ORDER — ONDANSETRON 2 MG/ML
8 INJECTION INTRAMUSCULAR; INTRAVENOUS ONCE
Status: COMPLETED | OUTPATIENT
Start: 2019-04-07 | End: 2019-04-07

## 2019-04-07 RX ORDER — ONDANSETRON 2 MG/ML
4 INJECTION INTRAMUSCULAR; INTRAVENOUS ONCE
Status: DISCONTINUED | OUTPATIENT
Start: 2019-04-07 | End: 2019-04-07

## 2019-04-07 RX ORDER — SODIUM CHLORIDE, SODIUM LACTATE, POTASSIUM CHLORIDE, CALCIUM CHLORIDE 600; 310; 30; 20 MG/100ML; MG/100ML; MG/100ML; MG/100ML
INJECTION, SOLUTION INTRAVENOUS CONTINUOUS
Status: ACTIVE | OUTPATIENT
Start: 2019-04-07 | End: 2019-04-08

## 2019-04-07 RX ORDER — METOCLOPRAMIDE HYDROCHLORIDE 5 MG/5ML
10 SOLUTION ORAL
Qty: 473 ML | Refills: 0 | Status: SHIPPED | OUTPATIENT
Start: 2019-04-07 | End: 2019-04-09 | Stop reason: HOSPADM

## 2019-04-07 RX ADMIN — METOCLOPRAMIDE HYDROCHLORIDE 10 MG: 5 SOLUTION ORAL at 11:04

## 2019-04-07 RX ADMIN — METOCLOPRAMIDE HYDROCHLORIDE 10 MG: 5 SOLUTION ORAL at 07:04

## 2019-04-07 RX ADMIN — ONDANSETRON 4 MG: 2 INJECTION INTRAMUSCULAR; INTRAVENOUS at 08:04

## 2019-04-07 RX ADMIN — PANTOPRAZOLE SODIUM 40 MG: 40 TABLET, DELAYED RELEASE ORAL at 08:04

## 2019-04-07 RX ADMIN — METOCLOPRAMIDE HYDROCHLORIDE 10 MG: 5 SOLUTION ORAL at 08:04

## 2019-04-07 RX ADMIN — SODIUM CHLORIDE, SODIUM LACTATE, POTASSIUM CHLORIDE, AND CALCIUM CHLORIDE: 600; 310; 30; 20 INJECTION, SOLUTION INTRAVENOUS at 06:04

## 2019-04-07 RX ADMIN — ONDANSETRON 8 MG: 8 TABLET, ORALLY DISINTEGRATING ORAL at 12:04

## 2019-04-07 RX ADMIN — SODIUM CHLORIDE 500 ML: 0.9 INJECTION, SOLUTION INTRAVENOUS at 11:04

## 2019-04-07 RX ADMIN — PROMETHAZINE HYDROCHLORIDE 12.5 MG: 25 INJECTION INTRAMUSCULAR; INTRAVENOUS at 11:04

## 2019-04-07 RX ADMIN — ONDANSETRON 8 MG: 2 INJECTION INTRAMUSCULAR; INTRAVENOUS at 02:04

## 2019-04-07 RX ADMIN — POLYETHYLENE GLYCOL 3350 17 G: 17 POWDER, FOR SOLUTION ORAL at 08:04

## 2019-04-07 RX ADMIN — PROMETHAZINE HYDROCHLORIDE 12.5 MG: 25 INJECTION INTRAMUSCULAR; INTRAVENOUS at 02:04

## 2019-04-07 RX ADMIN — KETOROLAC TROMETHAMINE 15 MG: 30 INJECTION, SOLUTION INTRAMUSCULAR at 02:04

## 2019-04-07 RX ADMIN — KETOROLAC TROMETHAMINE 15 MG: 30 INJECTION, SOLUTION INTRAMUSCULAR at 09:04

## 2019-04-07 RX ADMIN — PROMETHAZINE HYDROCHLORIDE 6.25 MG: 25 INJECTION INTRAMUSCULAR; INTRAVENOUS at 01:04

## 2019-04-07 NOTE — SUBJECTIVE & OBJECTIVE
No past medical history on file.    No past surgical history on file.    Review of patient's allergies indicates:  No Known Allergies    No current facility-administered medications on file prior to encounter.      Current Outpatient Medications on File Prior to Encounter   Medication Sig    ondansetron (ZOFRAN-ODT) 4 MG TbDL Take 1 tablet (4 mg total) by mouth every 6 (six) hours as needed (for nausea and vomiting).    promethazine (PHENERGAN) 25 MG suppository Place 1 suppository (25 mg total) rectally every 6 (six) hours as needed for Nausea.     Family History     None        Tobacco Use    Smoking status: Never Smoker   Substance and Sexual Activity    Alcohol use: Yes     Comment: occasional     Drug use: Not on file    Sexual activity: Not on file     Review of Systems   Constitutional: Positive for appetite change and fatigue. Negative for diaphoresis and fever.   Eyes: Negative for visual disturbance.   Respiratory: Negative for cough, shortness of breath and wheezing.    Cardiovascular: Negative for chest pain, palpitations and leg swelling.   Gastrointestinal: Positive for abdominal distention, abdominal pain, nausea and vomiting. Negative for anal bleeding, blood in stool, constipation and diarrhea.   Endocrine: Negative for polydipsia and polyphagia.   Genitourinary: Negative for difficulty urinating, dysuria and flank pain.   Musculoskeletal: Negative for arthralgias, back pain and gait problem.   Skin: Negative for rash and wound.   Neurological: Negative for dizziness, tremors, syncope, weakness, light-headedness, numbness and headaches.   Psychiatric/Behavioral: Negative for agitation and confusion.     Objective:     Vital Signs (Most Recent):  Temp: 98.8 °F (37.1 °C) (04/06/19 1344)  Pulse: 84 (04/06/19 1831)  Resp: 16 (04/06/19 1831)  BP: 132/76 (04/06/19 1831)  SpO2: 100 % (04/06/19 1831) Vital Signs (24h Range):  Temp:  [98.8 °F (37.1 °C)] 98.8 °F (37.1 °C)  Pulse:  [84-98] 84  Resp:   [16-18] 16  SpO2:  [100 %] 100 %  BP: (132-144)/(76-84) 132/76     Weight: 52.2 kg (115 lb)  Body mass index is 19.14 kg/m².    Physical Exam   Constitutional: She is oriented to person, place, and time. She appears well-developed and well-nourished. No distress.   thin   HENT:   Head: Normocephalic and atraumatic.   Eyes: Pupils are equal, round, and reactive to light. EOM are normal. No scleral icterus.   Neck: Normal range of motion.   Cardiovascular: Normal rate and regular rhythm.   No murmur heard.  Pulmonary/Chest: Effort normal and breath sounds normal. No stridor. No respiratory distress.   Abdominal: Soft. Bowel sounds are normal. She exhibits no distension. There is no tenderness. There is no guarding.   Musculoskeletal: Normal range of motion. She exhibits no edema.   Neurological: She is alert and oriented to person, place, and time. No cranial nerve deficit.   Skin: Skin is warm and dry. She is not diaphoretic.   Psychiatric: She has a normal mood and affect. Her behavior is normal. Judgment and thought content normal.   Nursing note and vitals reviewed.        CRANIAL NERVES     CN III, IV, VI   Pupils are equal, round, and reactive to light.  Extraocular motions are normal.        Significant Labs:   CBC:   Recent Labs   Lab 04/05/19  1112 04/06/19  1412   WBC 11.50 14.27*   HGB 13.4 13.6   HCT 39.3 39.9    330     CMP:   Recent Labs   Lab 04/05/19  1112 04/06/19  1412    138   K 3.4* 3.3*    104   CO2 20* 19*   * 107   BUN 8 8   CREATININE 0.8 0.9   CALCIUM 9.8 9.6   PROT 7.7 7.5   ALBUMIN 4.3 4.3   BILITOT 0.7 0.8   ALKPHOS 45* 42*   AST 25 26   ALT 17 20   ANIONGAP 14 15   EGFRNONAA >60 >60.0     Cardiac Markers: No results for input(s): CKMB, MYOGLOBIN, BNP, TROPISTAT in the last 48 hours.  Lipase:   Recent Labs   Lab 04/05/19  1112 04/06/19  1412   LIPASE 13 26     TSH: No results for input(s): TSH in the last 4320 hours.  Urine Culture: No results for input(s): LABURIN  in the last 48 hours.  Urine Studies:   Recent Labs   Lab 04/05/19  1036 04/06/19  1431   COLORU Yellow Yellow   APPEARANCEUA Cloudy* Hazy*   PHUR >8.0* 7.0   SPECGRAV 1.020 1.015   PROTEINUA 2+* 1+*   GLUCUA Negative Negative   KETONESU 3+* 3+*   BILIRUBINUA Negative Negative   OCCULTUA Trace* 1+*   NITRITE Negative Negative   UROBILINOGEN Negative  --    LEUKOCYTESUR Negative Negative   RBCUA 2 3   WBCUA 5 1   BACTERIA Few* Rare   SQUAMEPITHEL  --  5   HYALINECASTS 0 0       Significant Imaging: I have reviewed all pertinent imaging results/findings within the past 24 hours.

## 2019-04-07 NOTE — PROVIDER PROGRESS NOTES - EMERGENCY DEPT.
Encounter Date: 4/6/2019    ED Physician Progress Notes        Physician Note:   S/o Dr. Alvares: 18F with diffuse abd pain for several weeks, seen by GI with EGD and CT scan neg, neg celiac. Since Thurs continued N/V, 1x diarrhea, but abd pain worsening and localized to epigastrium and RUQ. No relief from Phenergan and Zofran given at [facility] yesterday. Today WBC elevated to 14, likely reactive and pt also dehydrated with 3+ ketones in urine. On exam pt appears uncomfortable, actively retching, mild upper abdominal TTP w/o rebound, no lower abdominal TTP concerning for pelvic disease or appendicitis. Will continue to attempt nausea control, however pt may need hospitalization for continued nausea and IV hydration.

## 2019-04-07 NOTE — PROGRESS NOTES
[facility] Hospital Medicine  Progress Note    Patient Name: Niru Phillips  MRN: [id number]  Patient Class: OP- Observation   Admission Date: 4/6/2019  Length of Stay: 0 days  Attending Physician: Lu Salazar MD  Primary Care Provider: [facility] Medicine Team: [facility] MED  Cori Alatorre PA-C    Subjective:     Principal Problem:Intractable nausea and vomiting    HPI:  Niru Phillips is a 18F with no past medical history who presents for evaluation of abdominal pain with nausea and vomiting. Her symptoms started in October with extensive bloating and abdominal pain after meals. No identifiable food triggers. She has tried various diet modifications without any change in symptoms. Sometimes improvement with TUMS, other times not. She can usually tolerate smoothies and liquids if she has them on their own. She has no trouble swallowing and can keep food down for about 10-15 minutes, then develops extreme abdominal distension.     She is in town from [place] for a wedding. She has had GI work in [place] for these symptoms without clear etiology. She was admitted for about a week in [place] where she was discharged with Protonix, Reglan, famotidine, and amitriptyline. She has not taken any of these medications since her discharge. She says her symptoms improved the most with reglan while in the hospital, but stopped because she was told it was not a long-term medications. She has had an EGD, CT, US, UGI w/ SBFT, celiac panel which were all negative per patient. She does smoke MJ once a month, last time she smoked was 3 weeks ago, no exacerbation of symptoms after last smoking. Denies fever, SOB, CP, HA, vision changes, hematemesis, diarrhea (but did have 1 episode of loose stools, that has resolved), neck stiffness, URI symptoms, sick contacts, urinary symptoms, vaginal bleeding, recent travel, odd foods, antibiotics, increased stress in her personal life. She  "does report some weight loss because of these problems.    The patient was able to find a photo of her EGD report which made mention of anatomy concerning for "SMA syndrome", but otherwise negative. SMA syndrome is a rare condition that occurs when the duodenum is compressed between the aorta and the superior mesenteric artery causing partial or complete blockage of the duodenum and often seen in thin patients with weight loss who lose the fat pad that usually maintains this gap.    She is tolerating broth and sprite during admission.    ED: CBC 14, K 3.3, ESR WNL, CRP WNL, UA negative, UPT negative, UDS negative, US abd negative.    Hospital Course:  Patient was admitted to observation for intractable abdominal pain, nausea and vomiting. Patient given IVF, and anti-emetics with good relief. Prior to discharge patient ate mashed potatoes, and abdominal pain and vomiting started. GI consulted.     Interval History: Patient without complaints this morning. Reports no further emesis, abdominal pain since yesterday. Discharge orders placed. Patient attempted to eat mashed potatoes, and vomiting, and pain returned. GI consulted, recommend scheduled zofran/phenergan, and reglan, started. EKG with QTc 416. Clear liquid diet.     Review of Systems   Constitutional: Positive for appetite change and fatigue.   HENT: Negative for congestion and rhinorrhea.    Respiratory: Negative for cough and shortness of breath.    Cardiovascular: Negative for chest pain and palpitations.   Gastrointestinal: Positive for abdominal pain (throbbing generalized pain), nausea and vomiting. Negative for abdominal distention and constipation.   Genitourinary: Negative for dysuria and hematuria.   Musculoskeletal: Negative for back pain and myalgias.   Neurological: Positive for weakness. Negative for dizziness and headaches.   Psychiatric/Behavioral: Negative for agitation, behavioral problems, confusion and decreased concentration. "     Objective:     Vital Signs (Most Recent):  Temp: 99.3 °F (37.4 °C) (04/07/19 1506)  Pulse: 75 (04/07/19 1506)  Resp: 18 (04/07/19 1506)  BP: 132/78 (04/07/19 1506)  SpO2: 100 % (04/07/19 1506) Vital Signs (24h Range):  Temp:  [97.8 °F (36.6 °C)-99.3 °F (37.4 °C)] 99.3 °F (37.4 °C)  Pulse:  [72-87] 75  Resp:  [13-18] 18  SpO2:  [98 %-100 %] 100 %  BP: (102-132)/(57-78) 132/78     Weight: 53.3 kg (117 lb 8.1 oz)  Body mass index is 19.55 kg/m².    Intake/Output Summary (Last 24 hours) at 4/7/2019 1736  Last data filed at 4/7/2019 0600  Gross per 24 hour   Intake 2075 ml   Output --   Net 2075 ml      Physical Exam   Constitutional: She is oriented to person, place, and time. She appears well-developed and well-nourished. She appears distressed.   HENT:   Head: Normocephalic and atraumatic.   Eyes: Pupils are equal, round, and reactive to light. EOM are normal.   Neck: Normal range of motion. Neck supple.   Cardiovascular: Normal rate, regular rhythm, normal heart sounds and intact distal pulses.   Pulmonary/Chest: Effort normal and breath sounds normal.   Abdominal: Soft. Bowel sounds are normal. She exhibits no distension. There is tenderness. There is no rebound and no guarding.   Musculoskeletal: Normal range of motion. She exhibits no edema.   Neurological: She is alert and oriented to person, place, and time.   Skin: Skin is warm and dry. Capillary refill takes less than 2 seconds.   Psychiatric: She has a normal mood and affect. Her behavior is normal. Judgment and thought content normal.   Nursing note and vitals reviewed.      Significant Labs:   CBC:   Recent Labs   Lab 04/06/19  1412 04/07/19  0349   WBC 14.27* 9.18   HGB 13.6 11.5*   HCT 39.9 34.2*    246     CMP:   Recent Labs   Lab 04/06/19  1412 04/07/19  0349    137   K 3.3* 3.9    109   CO2 19* 25    105   BUN 8 7   CREATININE 0.9 0.7   CALCIUM 9.6 8.5*   PROT 7.5  --    ALBUMIN 4.3  --    BILITOT 0.8  --    ALKPHOS 42*   --    AST 26  --    ALT 20  --    ANIONGAP 15 3*   EGFRNONAA >60.0 >60.0     Urine Culture: No results for input(s): LABURIN in the last 48 hours.    Significant Imaging: I have reviewed all pertinent imaging results/findings within the past 24 hours.    Assessment/Plan:      * Intractable nausea and vomiting  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, however, there is limited literature about this rare condition  - for now supportive care with reglan, repositioning after meals (prone or knee-chest position or by lying on the left side),   - patient wishes to get well enough to return to [place] for further management, but if no improvement may need to consult GI while here  - ESR/ CRP WNL  - afebrile, mild leukocytosis (likely reactionary) resolved  - GI consulted, appreciate recs  - scheduled zofran/ phenergan, reglan   - clear liquid diet  - IVF  - QTc 416    Hypokalemia  - replaced per IV      VTE Risk Mitigation (From admission, onward)        Ordered     IP VTE LOW RISK PATIENT  Once      04/06/19 1920     Place DARYL hose  Until discontinued      04/06/19 1920     Place DARYL hose  Until discontinued      04/06/19 1920     Place sequential compression device  Until discontinued      04/06/19 1920              Cori Alatorre PA-C  Department of Hospital Medicine   [facility]

## 2019-04-07 NOTE — SUBJECTIVE & OBJECTIVE
Interval History: Patient without complaints this morning. Reports no further emesis, abdominal pain since yesterday. Discharge orders placed. Patient attempted to eat mashed potatoes, and vomiting, and pain returned. GI consulted, recommend scheduled zofran/phenergan, and reglan, started. EKG with QTc 416. Clear liquid diet.     Review of Systems   Constitutional: Positive for appetite change and fatigue.   HENT: Negative for congestion and rhinorrhea.    Respiratory: Negative for cough and shortness of breath.    Cardiovascular: Negative for chest pain and palpitations.   Gastrointestinal: Positive for abdominal pain (throbbing generalized pain), nausea and vomiting. Negative for abdominal distention and constipation.   Genitourinary: Negative for dysuria and hematuria.   Musculoskeletal: Negative for back pain and myalgias.   Neurological: Positive for weakness. Negative for dizziness and headaches.   Psychiatric/Behavioral: Negative for agitation, behavioral problems, confusion and decreased concentration.     Objective:     Vital Signs (Most Recent):  Temp: 99.3 °F (37.4 °C) (04/07/19 1506)  Pulse: 75 (04/07/19 1506)  Resp: 18 (04/07/19 1506)  BP: 132/78 (04/07/19 1506)  SpO2: 100 % (04/07/19 1506) Vital Signs (24h Range):  Temp:  [97.8 °F (36.6 °C)-99.3 °F (37.4 °C)] 99.3 °F (37.4 °C)  Pulse:  [72-87] 75  Resp:  [13-18] 18  SpO2:  [98 %-100 %] 100 %  BP: (102-132)/(57-78) 132/78     Weight: 53.3 kg (117 lb 8.1 oz)  Body mass index is 19.55 kg/m².    Intake/Output Summary (Last 24 hours) at 4/7/2019 1736  Last data filed at 4/7/2019 0600  Gross per 24 hour   Intake 2075 ml   Output --   Net 2075 ml      Physical Exam   Constitutional: She is oriented to person, place, and time. She appears well-developed and well-nourished. She appears distressed.   HENT:   Head: Normocephalic and atraumatic.   Eyes: Pupils are equal, round, and reactive to light. EOM are normal.   Neck: Normal range of motion. Neck supple.    Cardiovascular: Normal rate, regular rhythm, normal heart sounds and intact distal pulses.   Pulmonary/Chest: Effort normal and breath sounds normal.   Abdominal: Soft. Bowel sounds are normal. She exhibits no distension. There is tenderness. There is no rebound and no guarding.   Musculoskeletal: Normal range of motion. She exhibits no edema.   Neurological: She is alert and oriented to person, place, and time.   Skin: Skin is warm and dry. Capillary refill takes less than 2 seconds.   Psychiatric: She has a normal mood and affect. Her behavior is normal. Judgment and thought content normal.   Nursing note and vitals reviewed.      Significant Labs:   CBC:   Recent Labs   Lab 04/06/19  1412 04/07/19  0349   WBC 14.27* 9.18   HGB 13.6 11.5*   HCT 39.9 34.2*    246     CMP:   Recent Labs   Lab 04/06/19  1412 04/07/19  0349    137   K 3.3* 3.9    109   CO2 19* 25    105   BUN 8 7   CREATININE 0.9 0.7   CALCIUM 9.6 8.5*   PROT 7.5  --    ALBUMIN 4.3  --    BILITOT 0.8  --    ALKPHOS 42*  --    AST 26  --    ALT 20  --    ANIONGAP 15 3*   EGFRNONAA >60.0 >60.0     Urine Culture: No results for input(s): LABURIN in the last 48 hours.    Significant Imaging: I have reviewed all pertinent imaging results/findings within the past 24 hours.

## 2019-04-07 NOTE — H&P
[facility] Hospital Medicine  History & Physical    Patient Name: Niru Phillips  MRN: [id number]  Admission Date: 4/6/2019  Attending Physician: Lu Salazar MD   Primary Care Provider: [facility] Medicine Team: [facility] MED  Feliberto Lorenzo PA-C     Patient information was obtained from patient, parent, past medical records and ER records.     Subjective:     Principal Problem:Intractable nausea and vomiting    Chief Complaint:   Chief Complaint   Patient presents with    Abdominal Pain     vomiting seen at [facility] with same thing,         HPI: Niru Phillips is a 18F with no past medical history who presents for evaluation of abdominal pain with nausea and vomiting. Her symptoms started in October with extensive bloating and abdominal pain after meals. No identifiable food triggers. She has tried various diet modifications without any change in symptoms. Sometimes improvement with TUMS, other times not. She can usually tolerate smoothies and liquids if she has them on their own. She has no trouble swallowing and can keep food down for about 10-15 minutes, then develops extreme abdominal distension.     She is in town from [place] for a wedding. She has had GI work in [place] for these symptoms without clear etiology. She was admitted for about a week in [place] where she was discharged with Protonix, Reglan, famotidine, and amitriptyline. She has not taken any of these medications since her discharge. She says her symptoms improved the most with reglan while in the hospital, but stopped because she was told it was not a long-term medications. She has had an EGD, CT, US, UGI w/ SBFT, celiac panel which were all negative per patient. She does smoke MJ once a month, last time she smoked was 3 weeks ago, no exacerbation of symptoms after last smoking. Denies fever, SOB, CP, HA, vision changes, hematemesis, diarrhea (but did have 1 episode of loose stools, that  "has resolved), neck stiffness, URI symptoms, sick contacts, urinary symptoms, vaginal bleeding, recent travel, odd foods, antibiotics, increased stress in her personal life. She does report some weight loss because of these problems.    The patient was able to find a photo of her EGD report which made mention of anatomy concerning for "SMA syndrome", but otherwise negative. SMA syndrome is a rare condition that occurs when the duodenum is compressed between the aorta and the superior mesenteric artery causing partial or complete blockage of the duodenum and often seen in thin patients with weight loss who lose the fat pad that usually maintains this gap.    She is tolerating broth and sprite during admission.    ED: CBC 14, K 3.3, ESR WNL, CRP WNL, UA negative, UPT negative, UDS negative, US abd negative.    No past medical history on file.    No past surgical history on file.    Review of patient's allergies indicates:  No Known Allergies    No current facility-administered medications on file prior to encounter.      Current Outpatient Medications on File Prior to Encounter   Medication Sig    ondansetron (ZOFRAN-ODT) 4 MG TbDL Take 1 tablet (4 mg total) by mouth every 6 (six) hours as needed (for nausea and vomiting).    promethazine (PHENERGAN) 25 MG suppository Place 1 suppository (25 mg total) rectally every 6 (six) hours as needed for Nausea.     Family History     None        Tobacco Use    Smoking status: Never Smoker   Substance and Sexual Activity    Alcohol use: Yes     Comment: occasional     Drug use: Not on file    Sexual activity: Not on file     Review of Systems   Constitutional: Positive for appetite change and fatigue. Negative for diaphoresis and fever.   Eyes: Negative for visual disturbance.   Respiratory: Negative for cough, shortness of breath and wheezing.    Cardiovascular: Negative for chest pain, palpitations and leg swelling.   Gastrointestinal: Positive for abdominal distention, " abdominal pain, nausea and vomiting. Negative for anal bleeding, blood in stool, constipation and diarrhea.   Endocrine: Negative for polydipsia and polyphagia.   Genitourinary: Negative for difficulty urinating, dysuria and flank pain.   Musculoskeletal: Negative for arthralgias, back pain and gait problem.   Skin: Negative for rash and wound.   Neurological: Negative for dizziness, tremors, syncope, weakness, light-headedness, numbness and headaches.   Psychiatric/Behavioral: Negative for agitation and confusion.     Objective:     Vital Signs (Most Recent):  Temp: 98.8 °F (37.1 °C) (04/06/19 1344)  Pulse: 84 (04/06/19 1831)  Resp: 16 (04/06/19 1831)  BP: 132/76 (04/06/19 1831)  SpO2: 100 % (04/06/19 1831) Vital Signs (24h Range):  Temp:  [98.8 °F (37.1 °C)] 98.8 °F (37.1 °C)  Pulse:  [84-98] 84  Resp:  [16-18] 16  SpO2:  [100 %] 100 %  BP: (132-144)/(76-84) 132/76     Weight: 52.2 kg (115 lb)  Body mass index is 19.14 kg/m².    Physical Exam   Constitutional: She is oriented to person, place, and time. She appears well-developed and well-nourished. No distress.   thin   HENT:   Head: Normocephalic and atraumatic.   Eyes: Pupils are equal, round, and reactive to light. EOM are normal. No scleral icterus.   Neck: Normal range of motion.   Cardiovascular: Normal rate and regular rhythm.   No murmur heard.  Pulmonary/Chest: Effort normal and breath sounds normal. No stridor. No respiratory distress.   Abdominal: Soft. Bowel sounds are normal. She exhibits no distension. There is no tenderness. There is no guarding.   Musculoskeletal: Normal range of motion. She exhibits no edema.   Neurological: She is alert and oriented to person, place, and time. No cranial nerve deficit.   Skin: Skin is warm and dry. She is not diaphoretic.   Psychiatric: She has a normal mood and affect. Her behavior is normal. Judgment and thought content normal.   Nursing note and vitals reviewed.        CRANIAL NERVES     CN III, IV, VI    Pupils are equal, round, and reactive to light.  Extraocular motions are normal.        Significant Labs:   CBC:   Recent Labs   Lab 04/05/19  1112 04/06/19  1412   WBC 11.50 14.27*   HGB 13.4 13.6   HCT 39.3 39.9    330     CMP:   Recent Labs   Lab 04/05/19  1112 04/06/19  1412    138   K 3.4* 3.3*    104   CO2 20* 19*   * 107   BUN 8 8   CREATININE 0.8 0.9   CALCIUM 9.8 9.6   PROT 7.7 7.5   ALBUMIN 4.3 4.3   BILITOT 0.7 0.8   ALKPHOS 45* 42*   AST 25 26   ALT 17 20   ANIONGAP 14 15   EGFRNONAA >60 >60.0     Cardiac Markers: No results for input(s): CKMB, MYOGLOBIN, BNP, TROPISTAT in the last 48 hours.  Lipase:   Recent Labs   Lab 04/05/19  1112 04/06/19  1412   LIPASE 13 26     TSH: No results for input(s): TSH in the last 4320 hours.  Urine Culture: No results for input(s): LABURIN in the last 48 hours.  Urine Studies:   Recent Labs   Lab 04/05/19  1036 04/06/19  1431   COLORU Yellow Yellow   APPEARANCEUA Cloudy* Hazy*   PHUR >8.0* 7.0   SPECGRAV 1.020 1.015   PROTEINUA 2+* 1+*   GLUCUA Negative Negative   KETONESU 3+* 3+*   BILIRUBINUA Negative Negative   OCCULTUA Trace* 1+*   NITRITE Negative Negative   UROBILINOGEN Negative  --    LEUKOCYTESUR Negative Negative   RBCUA 2 3   WBCUA 5 1   BACTERIA Few* Rare   SQUAMEPITHEL  --  5   HYALINECASTS 0 0       Significant Imaging: I have reviewed all pertinent imaging results/findings within the past 24 hours.    Assessment/Plan:     * Intractable nausea and vomiting  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, however, there is limited literature about this rare condition  - for now supportive care with reglan, repositioning after meals (prone or knee-chest position or by lying on the left side), soft/liquid high calorie diet  - patient wishes to get well enough to return to [place] for further management, but if no improvement may need  to consult GI while here    Hypokalemia  - replace per IV    VTE Risk Mitigation (From admission, onward)        Ordered     IP VTE LOW RISK PATIENT  Once      04/06/19 1920     Place DARYL hose  Until discontinued      04/06/19 1920     Place DARYL hose  Until discontinued      04/06/19 1920     Place sequential compression device  Until discontinued      04/06/19 1920             Feliberto Lorenzo PA-C  Department of Hospital Medicine   [facility]

## 2019-04-07 NOTE — PLAN OF CARE
Problem: Adult Inpatient Plan of Care  Goal: Plan of Care Review  Outcome: Ongoing (interventions implemented as appropriate)  No signs of distress this shift.  Pt tolerating clear liquid diet.  Pt tolerating IV fluids well this shift.  No complaints since arrival to floor.  Will continue to monitor this shift.

## 2019-04-07 NOTE — HPI
"Niru Phillips is a 18F with no past medical history who presents for evaluation of abdominal pain with nausea and vomiting. Her symptoms started in October with extensive bloating and abdominal pain after meals. No identifiable food triggers. She has tried various diet modifications without any change in symptoms. Sometimes improvement with TUMS, other times not. She can usually tolerate smoothies and liquids if she has them on their own. She has no trouble swallowing and can keep food down for about 10-15 minutes, then develops extreme abdominal distension.     She is in town from [place] for a wedding. She has had GI work in [place] for these symptoms without clear etiology. She was admitted for about a week in [place] where she was discharged with Protonix, Reglan, famotidine, and amitriptyline. She has not taken any of these medications since her discharge. She says her symptoms improved the most with reglan while in the hospital, but stopped because she was told it was not a long-term medications. She has had an EGD, CT, US, UGI w/ SBFT, celiac panel which were all negative per patient. She does smoke MJ once a month, last time she smoked was 3 weeks ago, no exacerbation of symptoms after last smoking. Denies fever, SOB, CP, HA, vision changes, hematemesis, diarrhea (but did have 1 episode of loose stools, that has resolved), neck stiffness, URI symptoms, sick contacts, urinary symptoms, vaginal bleeding, recent travel, odd foods, antibiotics, increased stress in her personal life. She does report some weight loss because of these problems.    The patient was able to find a photo of her EGD report which made mention of anatomy concerning for "SMA syndrome", but otherwise negative. SMA syndrome is a rare condition that occurs when the duodenum is compressed between the aorta and the superior mesenteric artery causing partial or complete blockage of the duodenum and often seen in thin patients with weight " loss who lose the fat pad that usually maintains this gap.    She is tolerating broth and sprite during admission.    ED: CBC 14, K 3.3, ESR WNL, CRP WNL, UA negative, UPT negative, UDS negative, US abd negative.

## 2019-04-07 NOTE — HOSPITAL COURSE
Patient was admitted to observation for intractable abdominal pain, nausea and vomiting. Patient given IVF, and anti-emetics with good relief. GI consulted and recommend starting elavil, PPI, Rifaxzimin for 14 days; and to follow up with GI in [place].  Patient able to tolerate PO intake prior to discharge

## 2019-04-08 LAB
ACANTHOCYTES BLD QL SMEAR: ABNORMAL
ANION GAP SERPL CALC-SCNC: 8 MMOL/L (ref 8–16)
ANISOCYTOSIS BLD QL SMEAR: ABNORMAL
AUER BODIES BLD QL SMEAR: ABNORMAL
BASO STIPL BLD QL SMEAR: ABNORMAL
BASOPHILS # BLD AUTO: 0.03 K/UL (ref 0–0.2)
BASOPHILS # BLD AUTO: ABNORMAL K/UL (ref 0–0.2)
BASOPHILS NFR BLD: 0.4 % (ref 0–1.9)
BASOPHILS NFR BLD: ABNORMAL % (ref 0–1.9)
BLASTS NFR BLD MANUAL: ABNORMAL %
BUN SERPL-MCNC: 5 MG/DL (ref 6–20)
BURR CELLS BLD QL SMEAR: ABNORMAL
CABOT RINGS BLD QL SMEAR: ABNORMAL
CALCIUM SERPL-MCNC: 9.2 MG/DL (ref 8.7–10.5)
CHLORIDE SERPL-SCNC: 104 MMOL/L (ref 95–110)
CO2 SERPL-SCNC: 26 MMOL/L (ref 23–29)
CREAT SERPL-MCNC: 0.8 MG/DL (ref 0.5–1.4)
DACRYOCYTES BLD QL SMEAR: ABNORMAL
DIFFERENTIAL METHOD: ABNORMAL
DIFFERENTIAL METHOD: ABNORMAL
DOHLE BOD BLD QL SMEAR: ABNORMAL
EOSINOPHIL # BLD AUTO: 0 K/UL (ref 0–0.5)
EOSINOPHIL # BLD AUTO: ABNORMAL K/UL (ref 0–0.5)
EOSINOPHIL NFR BLD: 0.5 % (ref 0–8)
EOSINOPHIL NFR BLD: ABNORMAL % (ref 0–8)
ERYTHROCYTE [DISTWIDTH] IN BLOOD BY AUTOMATED COUNT: 11.4 % (ref 11.5–14.5)
ERYTHROCYTE [DISTWIDTH] IN BLOOD BY AUTOMATED COUNT: ABNORMAL % (ref 11.5–14.5)
EST. GFR  (AFRICAN AMERICAN): >60 ML/MIN/1.73 M^2
EST. GFR  (NON AFRICAN AMERICAN): >60 ML/MIN/1.73 M^2
GIANT PLATELETS BLD QL SMEAR: ABNORMAL
GLUCOSE SERPL-MCNC: 82 MG/DL (ref 70–110)
HCT VFR BLD AUTO: 41.9 % (ref 37–48.5)
HCT VFR BLD AUTO: ABNORMAL % (ref 37–48.5)
HEINZ BOD BLD QL SMEAR: ABNORMAL
HGB BLD-MCNC: 14.4 G/DL (ref 12–16)
HGB BLD-MCNC: ABNORMAL G/DL (ref 12–16)
HGB C CRY RBC QL MICRO: ABNORMAL
HOWELL-JOLLY BOD BLD QL SMEAR: ABNORMAL
HYPOCHROMIA BLD QL SMEAR: ABNORMAL
IMM GRANULOCYTES # BLD AUTO: 0.02 K/UL (ref 0–0.04)
IMM GRANULOCYTES # BLD AUTO: ABNORMAL K/UL (ref 0–0.04)
IMM GRANULOCYTES NFR BLD AUTO: 0.2 % (ref 0–0.5)
IMM GRANULOCYTES NFR BLD AUTO: ABNORMAL % (ref 0–0.5)
LYMPHOCYTES # BLD AUTO: 2.9 K/UL (ref 1–4.8)
LYMPHOCYTES # BLD AUTO: ABNORMAL K/UL (ref 1–4.8)
LYMPHOCYTES NFR BLD: 34.5 % (ref 18–48)
LYMPHOCYTES NFR BLD: ABNORMAL % (ref 18–48)
MAGNESIUM SERPL-MCNC: 2 MG/DL (ref 1.6–2.6)
MCH RBC QN AUTO: 31 PG (ref 27–31)
MCH RBC QN AUTO: ABNORMAL PG (ref 27–31)
MCHC RBC AUTO-ENTMCNC: 34.4 G/DL (ref 32–36)
MCHC RBC AUTO-ENTMCNC: ABNORMAL G/DL (ref 32–36)
MCV RBC AUTO: 90 FL (ref 82–98)
MCV RBC AUTO: ABNORMAL FL (ref 82–98)
MEGAKARYOCYTIC FRAGMENTS: ABNORMAL
METAMYELOCYTES NFR BLD MANUAL: ABNORMAL %
MONOCYTES # BLD AUTO: 0.6 K/UL (ref 0.3–1)
MONOCYTES # BLD AUTO: ABNORMAL K/UL (ref 0.3–1)
MONOCYTES NFR BLD: 7.1 % (ref 4–15)
MONOCYTES NFR BLD: ABNORMAL % (ref 4–15)
MYELOCYTES NFR BLD MANUAL: ABNORMAL %
NEUTROPHILS # BLD AUTO: 4.8 K/UL (ref 1.8–7.7)
NEUTROPHILS # BLD AUTO: ABNORMAL K/UL (ref 1.8–7.7)
NEUTROPHILS NFR BLD: 57.3 % (ref 38–73)
NEUTROPHILS NFR BLD: ABNORMAL % (ref 38–73)
NEUTS BAND NFR BLD MANUAL: ABNORMAL %
NRBC BLD-RTO: 0 /100 WBC
NRBC BLD-RTO: ABNORMAL /100 WBC
OVALOCYTES BLD QL SMEAR: ABNORMAL
PAPPENHEIMER BOD BLD QL SMEAR: ABNORMAL
PHOSPHATE SERPL-MCNC: 3.3 MG/DL (ref 2.7–4.5)
PLASMODIUM BLD QL SMEAR: ABNORMAL
PLATELET # BLD AUTO: 293 K/UL (ref 150–350)
PLATELET # BLD AUTO: ABNORMAL K/UL (ref 150–350)
PLATELET BLD QL SMEAR: ABNORMAL
PMV BLD AUTO: 10 FL (ref 9.2–12.9)
PMV BLD AUTO: ABNORMAL FL (ref 9.2–12.9)
POCT GLUCOSE: 85 MG/DL (ref 70–110)
POIKILOCYTOSIS BLD QL SMEAR: ABNORMAL
POLYCHROMASIA BLD QL SMEAR: ABNORMAL
POTASSIUM SERPL-SCNC: 3.4 MMOL/L (ref 3.5–5.1)
PROMYELOCYTES NFR BLD MANUAL: ABNORMAL %
RBC # BLD AUTO: 4.65 M/UL (ref 4–5.4)
RBC # BLD AUTO: ABNORMAL M/UL (ref 4–5.4)
RBC AGGLUT BLD QL: ABNORMAL
ROULEAUX BLD QL SMEAR: ABNORMAL
SCHISTOCYTES BLD QL SMEAR: ABNORMAL
SCHISTOCYTES BLD QL SMEAR: ABNORMAL
SICKLE CELLS BLD QL SMEAR: ABNORMAL
SMUDGE CELLS BLD QL SMEAR: ABNORMAL
SODIUM SERPL-SCNC: 138 MMOL/L (ref 136–145)
SPHEROCYTES BLD QL SMEAR: ABNORMAL
STOMATOCYTES BLD QL SMEAR: ABNORMAL
TARGETS BLD QL SMEAR: ABNORMAL
TOXIC GRANULES BLD QL SMEAR: ABNORMAL
WBC # BLD AUTO: 8.43 K/UL (ref 3.9–12.7)
WBC # BLD AUTO: ABNORMAL K/UL (ref 3.9–12.7)
WBC NRBC COR # BLD: ABNORMAL K/UL (ref 3.9–12.7)
WBC OTHER NFR BLD MANUAL: ABNORMAL %
WBC TOXIC VACUOLES BLD QL SMEAR: ABNORMAL

## 2019-04-08 PROCEDURE — G0378 HOSPITAL OBSERVATION PER HR: HCPCS

## 2019-04-08 PROCEDURE — 63600175 PHARM REV CODE 636 W HCPCS: Performed by: PHYSICIAN ASSISTANT

## 2019-04-08 PROCEDURE — 84100 ASSAY OF PHOSPHORUS: CPT

## 2019-04-08 PROCEDURE — 99217 PR OBSERVATION CARE DISCHARGE: CPT | Mod: ,,, | Performed by: PHYSICIAN ASSISTANT

## 2019-04-08 PROCEDURE — 99217 PR OBSERVATION CARE DISCHARGE: ICD-10-PCS | Mod: ,,, | Performed by: PHYSICIAN ASSISTANT

## 2019-04-08 PROCEDURE — 36415 COLL VENOUS BLD VENIPUNCTURE: CPT

## 2019-04-08 PROCEDURE — 25000003 PHARM REV CODE 250: Performed by: PHYSICIAN ASSISTANT

## 2019-04-08 PROCEDURE — 85025 COMPLETE CBC W/AUTO DIFF WBC: CPT

## 2019-04-08 PROCEDURE — 80048 BASIC METABOLIC PNL TOTAL CA: CPT

## 2019-04-08 PROCEDURE — 83735 ASSAY OF MAGNESIUM: CPT

## 2019-04-08 RX ORDER — AMITRIPTYLINE HYDROCHLORIDE 25 MG/1
25 TABLET, FILM COATED ORAL DAILY
Status: DISCONTINUED | OUTPATIENT
Start: 2019-04-09 | End: 2019-04-08

## 2019-04-08 RX ORDER — POTASSIUM CHLORIDE 7.45 MG/ML
10 INJECTION INTRAVENOUS ONCE
Status: COMPLETED | OUTPATIENT
Start: 2019-04-08 | End: 2019-04-08

## 2019-04-08 RX ORDER — LIDOCAINE 50 MG/G
1 PATCH TOPICAL
Status: DISCONTINUED | OUTPATIENT
Start: 2019-04-08 | End: 2019-04-09 | Stop reason: HOSPADM

## 2019-04-08 RX ORDER — MORPHINE SULFATE 2 MG/ML
0.5 INJECTION, SOLUTION INTRAMUSCULAR; INTRAVENOUS ONCE
Status: COMPLETED | OUTPATIENT
Start: 2019-04-08 | End: 2019-04-08

## 2019-04-08 RX ORDER — KETOROLAC TROMETHAMINE 30 MG/ML
30 INJECTION, SOLUTION INTRAMUSCULAR; INTRAVENOUS EVERY 6 HOURS PRN
Status: DISCONTINUED | OUTPATIENT
Start: 2019-04-08 | End: 2019-04-09 | Stop reason: HOSPADM

## 2019-04-08 RX ORDER — ONDANSETRON 8 MG/1
8 TABLET, ORALLY DISINTEGRATING ORAL EVERY 6 HOURS PRN
Qty: 6 TABLET | Refills: 0 | Status: SHIPPED | OUTPATIENT
Start: 2019-04-08

## 2019-04-08 RX ORDER — AMITRIPTYLINE HYDROCHLORIDE 25 MG/1
25 TABLET, FILM COATED ORAL NIGHTLY
Status: DISCONTINUED | OUTPATIENT
Start: 2019-04-08 | End: 2019-04-09 | Stop reason: HOSPADM

## 2019-04-08 RX ADMIN — ONDANSETRON 4 MG: 2 INJECTION INTRAMUSCULAR; INTRAVENOUS at 10:04

## 2019-04-08 RX ADMIN — RIFAXIMIN 550 MG: 550 TABLET ORAL at 10:04

## 2019-04-08 RX ADMIN — AMITRIPTYLINE HYDROCHLORIDE 25 MG: 25 TABLET, FILM COATED ORAL at 10:04

## 2019-04-08 RX ADMIN — PANTOPRAZOLE SODIUM 40 MG: 40 TABLET, DELAYED RELEASE ORAL at 09:04

## 2019-04-08 RX ADMIN — MORPHINE SULFATE 0.5 MG: 2 INJECTION, SOLUTION INTRAMUSCULAR; INTRAVENOUS at 03:04

## 2019-04-08 RX ADMIN — RAMELTEON 8 MG: 8 TABLET, FILM COATED ORAL at 12:04

## 2019-04-08 RX ADMIN — METOCLOPRAMIDE HYDROCHLORIDE 10 MG: 5 SOLUTION ORAL at 06:04

## 2019-04-08 RX ADMIN — PROMETHAZINE HYDROCHLORIDE 12.5 MG: 25 INJECTION INTRAMUSCULAR; INTRAVENOUS at 12:04

## 2019-04-08 RX ADMIN — ONDANSETRON 4 MG: 2 INJECTION INTRAMUSCULAR; INTRAVENOUS at 03:04

## 2019-04-08 RX ADMIN — PROMETHAZINE HYDROCHLORIDE 12.5 MG: 25 INJECTION INTRAMUSCULAR; INTRAVENOUS at 06:04

## 2019-04-08 RX ADMIN — KETOROLAC TROMETHAMINE 15 MG: 30 INJECTION, SOLUTION INTRAMUSCULAR at 12:04

## 2019-04-08 RX ADMIN — METOCLOPRAMIDE HYDROCHLORIDE 10 MG: 5 SOLUTION ORAL at 09:04

## 2019-04-08 RX ADMIN — ONDANSETRON 4 MG: 2 INJECTION INTRAMUSCULAR; INTRAVENOUS at 09:04

## 2019-04-08 RX ADMIN — METOCLOPRAMIDE HYDROCHLORIDE 10 MG: 5 SOLUTION ORAL at 10:04

## 2019-04-08 RX ADMIN — PROMETHAZINE HYDROCHLORIDE 12.5 MG: 25 INJECTION INTRAMUSCULAR; INTRAVENOUS at 05:04

## 2019-04-08 RX ADMIN — POTASSIUM CHLORIDE 10 MEQ: 10 INJECTION, SOLUTION INTRAVENOUS at 02:04

## 2019-04-08 RX ADMIN — LIDOCAINE 1 PATCH: 50 PATCH TOPICAL at 09:04

## 2019-04-08 NOTE — HPI
18 year old female with no significant history currently admitted to medicine on who GI is being consulted with for intractable nausea and emesis.     Patient symptoms initially began in October at which point she noted bloating with meals. Then January after returning from a conference in [place] she was admitted to the hospital for ~1 week due to excessive bloating, nausea, and emesis. Remember taking Reglan which helped her symptoms. Between January to this current admission she continue to complain of bloating but the nausea/emesis did not recurrent until this current admission. On Thursday she had small bites of lobster, steak, and mash potatoes. She was in town for a wedding and to due nausea/emesis to an Uber to [facility] on Friday. She was discharged from the ED however returned Saturday with continued nausea/emesis. Since being here she initially improved but worsened once again after having mash potatoes. Today reports emesis almost right after having some broth/blue powerade.     Smokes cannabis 1/month. No recent travel or sick contacts.Current medications include OCP and MV. She lives at home with her parents, works 40 hrs/week, and goes to school.  Last she reports EGD ~ 2-3 weeks ago which showed a torturous duodenum with a concern for SMA syndrome, however normal CT (all back home, patient has picture of report). She also had an UGI-SBFT which was negative. Results of studies were confirmed by her mom via phone today with her GI MD.

## 2019-04-08 NOTE — PLAN OF CARE
CM notified patient's nurse Milagro, per tha Chairez to give patient copies of Ultrasound report to take to [place] for follow up with physician there. VORB. Dr Salazar/ROSS Sidhu, AALIYAH Sidhu RN [facility]   Case Management     [phone]  '

## 2019-04-08 NOTE — NURSING
Victoria in Lab called to notify me of critical labs.  CA 6.9, Glucose 45, Mg 0.7.  Pt had been stuck 6 times to get labs and labs were drawn above IV fluids.  I ordered recollect and did a fingerstick.  Fingerstick result was Glucose 85.

## 2019-04-08 NOTE — PLAN OF CARE
Jessica Savagena  [address] [facility] [address]   [phone]  None      [id number] - [place], [place] - [address]  [address]  [facility] [address]  Phone: [phone] Fax: [phone]    Payor: RICK / Plan: [employer] OPEN ACCESS PLUS / Product Type: Commercial /     Extended Emergency Contact Information  Primary Emergency Contact: RICH BUITRAGO  Mobile Phone: [phone]  Relation: Mother   needed? No    No future appointments.       04/08/19 1109   Discharge Assessment   Assessment Type Discharge Planning Assessment   Confirmed/corrected address and phone number on facesheet? Yes   Assessment information obtained from? Patient;Caregiver   Expected Length of Stay (days) 2   Communicated expected length of stay with patient/caregiver yes   Prior to hospitilization cognitive status: Alert/Oriented   Prior to hospitalization functional status: Independent   Current cognitive status: Alert/Oriented   Current Functional Status: Independent   Facility Arrived From: [facility]   Lives With parent(s)   Able to Return to Prior Arrangements yes   Is patient able to care for self after discharge? Yes   Who are your caregiver(s) and their phone number(s)? Parents    Patient's perception of discharge disposition home or selfcare   Equipment Currently Used at Home none   Do you have any problems affording any of your prescribed medications? No   Is the patient taking medications as prescribed? yes   Does the patient have transportation home? Yes   Transportation Anticipated family or friend will provide   Discharge Plan A Home with family   DME Needed Upon Discharge  none   Patient/Family in Agreement with Plan yes     CM met with patient and parents at the bedside to discuss POC. Patient states that she lives in a 2 story home with both parents (15 steps to maneuver). Patient states that she was independent prior to hospital admission. [facility] care  packet given to patient with understanding verbalized. Patient is expected to discharge and fly home with parents later this evening. Patient denies any questions or concerns at this time. CM will continue to monitor.    Nanette Sidhu RN [facility]   Case Management     [phone]

## 2019-04-08 NOTE — PROGRESS NOTES
[facility] Hospital Medicine  Progress Note    Patient Name: Niru Phillips  MRN: [id number]  Patient Class: OP- Observation   Admission Date: 4/6/2019  Length of Stay: 0 days  Attending Physician: Lu Salazar MD  Primary Care Provider: [facility] Medicine Team: [facility] F Cori Alatorre PA-C    Subjective:     Principal Problem:Intractable nausea and vomiting    HPI:  Niru Phillips is a 18F with no past medical history who presents for evaluation of abdominal pain with nausea and vomiting. Her symptoms started in October with extensive bloating and abdominal pain after meals. No identifiable food triggers. She has tried various diet modifications without any change in symptoms. Sometimes improvement with TUMS, other times not. She can usually tolerate smoothies and liquids if she has them on their own. She has no trouble swallowing and can keep food down for about 10-15 minutes, then develops extreme abdominal distension.     She is in town from [place] for a wedding. She has had GI work in [place] for these symptoms without clear etiology. She was admitted for about a week in [place] where she was discharged with Protonix, Reglan, famotidine, and amitriptyline. She has not taken any of these medications since her discharge. She says her symptoms improved the most with reglan while in the hospital, but stopped because she was told it was not a long-term medications. She has had an EGD, CT, US, UGI w/ SBFT, celiac panel which were all negative per patient. She does smoke MJ once a month, last time she smoked was 3 weeks ago, no exacerbation of symptoms after last smoking. Denies fever, SOB, CP, HA, vision changes, hematemesis, diarrhea (but did have 1 episode of loose stools, that has resolved), neck stiffness, URI symptoms, sick contacts, urinary symptoms, vaginal bleeding, recent travel, odd foods, antibiotics, increased stress in her personal life. She does  "report some weight loss because of these problems.    The patient was able to find a photo of her EGD report which made mention of anatomy concerning for "SMA syndrome", but otherwise negative. SMA syndrome is a rare condition that occurs when the duodenum is compressed between the aorta and the superior mesenteric artery causing partial or complete blockage of the duodenum and often seen in thin patients with weight loss who lose the fat pad that usually maintains this gap.    She is tolerating broth and sprite during admission.    ED: CBC 14, K 3.3, ESR WNL, CRP WNL, UA negative, UPT negative, UDS negative, US abd negative.    Hospital Course:  Patient was admitted to observation for intractable abdominal pain, nausea and vomiting. Patient given IVF, and anti-emetics with good relief. Prior to discharge patient ate mashed potatoes, and abdominal pain and vomiting started. GI consulted.     Interval History: Patient without complaints this morning. K 3.4, replaced. Plan was to get patient stable enough to make flight tonight to go back to [place] and see her GI doctors. This afternoon, patient with emesis, abdominal pain, and I episode of diarrhea after eating chicken broth. Family now requesting GI workup here as they do not believe patient will be able to tolerate flight. Phenergan, zofran, and reglan scheduled. GI consulted.     Review of Systems   Constitutional: Positive for appetite change and fatigue.   HENT: Negative for congestion and rhinorrhea.    Respiratory: Negative for cough and shortness of breath.    Cardiovascular: Positive for chest pain (2/2 vomiting). Negative for palpitations.   Gastrointestinal: Positive for abdominal pain (throbbing generalized pain), diarrhea, nausea and vomiting. Negative for abdominal distention and constipation.   Genitourinary: Negative for dysuria and hematuria.   Musculoskeletal: Negative for back pain and myalgias.   Skin: Negative for rash and wound.   Neurological: " Positive for weakness. Negative for dizziness and headaches.   Psychiatric/Behavioral: Negative for agitation, behavioral problems, confusion and decreased concentration.     Objective:     Vital Signs (Most Recent):  Temp: 97.7 °F (36.5 °C) (04/08/19 1151)  Pulse: 76 (04/08/19 1151)  Resp: 18 (04/08/19 1151)  BP: 132/86 (04/08/19 1151)  SpO2: 100 % (04/08/19 1151) Vital Signs (24h Range):  Temp:  [97.7 °F (36.5 °C)-99.4 °F (37.4 °C)] 97.7 °F (36.5 °C)  Pulse:  [72-96] 76  Resp:  [13-18] 18  SpO2:  [96 %-100 %] 100 %  BP: (107-155)/(64-94) 132/86     Weight: 53.3 kg (117 lb 8.1 oz)  Body mass index is 19.55 kg/m².    Intake/Output Summary (Last 24 hours) at 4/8/2019 1530  Last data filed at 4/8/2019 0600  Gross per 24 hour   Intake 930 ml   Output --   Net 930 ml      Physical Exam   Constitutional: She is oriented to person, place, and time. She appears well-developed and well-nourished. She appears distressed.   HENT:   Head: Normocephalic and atraumatic.   Eyes: Pupils are equal, round, and reactive to light. EOM are normal.   Neck: Normal range of motion. Neck supple.   Cardiovascular: Normal rate, regular rhythm, normal heart sounds and intact distal pulses.   Pulmonary/Chest: Effort normal and breath sounds normal.   Abdominal: Soft. Bowel sounds are normal. She exhibits no distension. There is tenderness. There is no rebound and no guarding.   Musculoskeletal: Normal range of motion. She exhibits no edema.   Neurological: She is alert and oriented to person, place, and time.   Skin: Skin is warm and dry. Capillary refill takes less than 2 seconds.   Psychiatric: She has a normal mood and affect. Her behavior is normal. Judgment and thought content normal.   Nursing note and vitals reviewed.      Significant Labs:   CBC:   Recent Labs   Lab 04/07/19  0349 04/08/19  0416 04/08/19  0728   WBC 9.18 SEE COMMENT 8.43   HGB 11.5* SEE COMMENT 14.4   HCT 34.2* SEE COMMENT 41.9    SEE COMMENT 293     CMP:    Recent Labs   Lab 04/07/19  0349 04/08/19  0728    138   K 3.9 3.4*    104   CO2 25 26    82   BUN 7 5*   CREATININE 0.7 0.8   CALCIUM 8.5* 9.2   ANIONGAP 3* 8   EGFRNONAA >60.0 >60.0     POCT Glucose:   Recent Labs   Lab 04/08/19  0619   POCTGLUCOSE 85       Significant Imaging: I have reviewed all pertinent imaging results/findings within the past 24 hours.    Assessment/Plan:      * Intractable nausea and vomiting  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, however, there is limited literature about this rare condition  - for now supportive care with reglan, repositioning after meals (prone or knee-chest position or by lying on the left side),   - patient wishes to get well enough to return to [facility] for further management, but if no improvement may need to consult GI while here  - RUQ abdominal US unremarkable, stomach distended with fluid and scattered echogenic debris which is nonspecific  - ESR/ CRP WNL  - afebrile, mild leukocytosis (likely reactionary) resolved  - GI consulted, appreciate recs  - patient with emesis, abdominal pain, and 1 episode of diarrhea after eating broth, family now wishes for further work up here  - No further work up of diarrhea at this time as only 1 episode, if more will get testing  - scheduled zofran/ phenergan, reglan   - clear liquid diet  - IVF  - QTc 416    Hypokalemia  - replaced per IV      VTE Risk Mitigation (From admission, onward)        Ordered     IP VTE LOW RISK PATIENT  Once      04/06/19 1920     Place sequential compression device  Until discontinued      04/06/19 1920              Cori Alatorre PA-C  Department of Hospital Medicine   [facility]

## 2019-04-08 NOTE — SUBJECTIVE & OBJECTIVE
History reviewed. No pertinent past medical history.    History reviewed. No pertinent surgical history.    Review of patient's allergies indicates:  No Known Allergies  Family History     None        Tobacco Use    Smoking status: Never Smoker   Substance and Sexual Activity    Alcohol use: Yes     Comment: occasional     Drug use: Not on file    Sexual activity: Not on file     Review of Systems   Constitutional: Positive for activity change and appetite change. Negative for chills, diaphoresis, fatigue and fever.   HENT: Negative for trouble swallowing and voice change.    Eyes: Negative for photophobia, pain, discharge, redness, itching and visual disturbance.   Respiratory: Negative for cough and shortness of breath.    Cardiovascular: Negative for chest pain and palpitations.   Gastrointestinal: Positive for abdominal pain, diarrhea, nausea and vomiting. Negative for abdominal distention, anal bleeding, blood in stool, constipation and rectal pain.   Endocrine: Negative for cold intolerance, heat intolerance, polydipsia, polyphagia and polyuria.   Genitourinary: Negative for dysuria, frequency, hematuria and urgency.   Musculoskeletal: Negative for back pain and neck pain.   Skin: Negative for color change, pallor, rash and wound.   Neurological: Positive for weakness. Negative for dizziness, syncope and light-headedness.     Objective:     Vital Signs (Most Recent):  Temp: 97.7 °F (36.5 °C) (04/08/19 1151)  Pulse: 76 (04/08/19 1151)  Resp: 18 (04/08/19 1151)  BP: 132/86 (04/08/19 1151)  SpO2: 100 % (04/08/19 1151) Vital Signs (24h Range):  Temp:  [97.7 °F (36.5 °C)-99.4 °F (37.4 °C)] 97.7 °F (36.5 °C)  Pulse:  [72-96] 76  Resp:  [13-18] 18  SpO2:  [96 %-100 %] 100 %  BP: (107-155)/(64-94) 132/86     Weight: 53.3 kg (117 lb 8.1 oz) (04/06/19 2107)  Body mass index is 19.55 kg/m².      Intake/Output Summary (Last 24 hours) at 4/8/2019 1835  Last data filed at 4/8/2019 0600  Gross per 24 hour   Intake 930 ml    Output --   Net 930 ml       Lines/Drains/Airways     Peripheral Intravenous Line                 Peripheral IV - Single Lumen 04/07/19 2053 Left Wrist less than 1 day                Physical Exam   Constitutional: She is oriented to person, place, and time. She appears well-developed and well-nourished.   HENT:   Head: Normocephalic and atraumatic.   Eyes: No scleral icterus.   Cardiovascular: Normal rate and regular rhythm.   Pulmonary/Chest: Effort normal and breath sounds normal.   Abdominal: Soft. Bowel sounds are normal.   Musculoskeletal: She exhibits no edema.   Neurological: She is alert and oriented to person, place, and time.   Nursing note and vitals reviewed.      Significant Labs:  CBC:   Recent Labs   Lab 04/07/19  0349 04/08/19  0416 04/08/19  0728   WBC 9.18 SEE COMMENT 8.43   HGB 11.5* SEE COMMENT 14.4   HCT 34.2* SEE COMMENT 41.9    SEE COMMENT 293     CMP:   Recent Labs   Lab 04/08/19  0728   GLU 82   CALCIUM 9.2      K 3.4*   CO2 26      BUN 5*   CREATININE 0.8     Coagulation: No results for input(s): PT, INR, APTT in the last 48 hours.    Significant Imaging:  Imaging results within the past 24 hours have been reviewed.

## 2019-04-08 NOTE — SUBJECTIVE & OBJECTIVE
Interval History: Patient without complaints this morning. K 3.4, replaced. Plan was to get patient stable enough to make flight tonight to go back to [place] and see her GI doctors. This afternoon, patient with emesis, abdominal pain, and I episode of diarrhea after eating chicken broth. Family now requesting GI workup here as they do not believe patient will be able to tolerate flight. Phenergan, zofran, and reglan scheduled. GI consulted.     Review of Systems   Constitutional: Positive for appetite change and fatigue.   HENT: Negative for congestion and rhinorrhea.    Respiratory: Negative for cough and shortness of breath.    Cardiovascular: Positive for chest pain (2/2 vomiting). Negative for palpitations.   Gastrointestinal: Positive for abdominal pain (throbbing generalized pain), diarrhea, nausea and vomiting. Negative for abdominal distention and constipation.   Genitourinary: Negative for dysuria and hematuria.   Musculoskeletal: Negative for back pain and myalgias.   Skin: Negative for rash and wound.   Neurological: Positive for weakness. Negative for dizziness and headaches.   Psychiatric/Behavioral: Negative for agitation, behavioral problems, confusion and decreased concentration.     Objective:     Vital Signs (Most Recent):  Temp: 97.7 °F (36.5 °C) (04/08/19 1151)  Pulse: 76 (04/08/19 1151)  Resp: 18 (04/08/19 1151)  BP: 132/86 (04/08/19 1151)  SpO2: 100 % (04/08/19 1151) Vital Signs (24h Range):  Temp:  [97.7 °F (36.5 °C)-99.4 °F (37.4 °C)] 97.7 °F (36.5 °C)  Pulse:  [72-96] 76  Resp:  [13-18] 18  SpO2:  [96 %-100 %] 100 %  BP: (107-155)/(64-94) 132/86     Weight: 53.3 kg (117 lb 8.1 oz)  Body mass index is 19.55 kg/m².    Intake/Output Summary (Last 24 hours) at 4/8/2019 1530  Last data filed at 4/8/2019 0600  Gross per 24 hour   Intake 930 ml   Output --   Net 930 ml      Physical Exam   Constitutional: She is oriented to person, place, and time. She appears well-developed and well-nourished.  She appears distressed.   HENT:   Head: Normocephalic and atraumatic.   Eyes: Pupils are equal, round, and reactive to light. EOM are normal.   Neck: Normal range of motion. Neck supple.   Cardiovascular: Normal rate, regular rhythm, normal heart sounds and intact distal pulses.   Pulmonary/Chest: Effort normal and breath sounds normal.   Abdominal: Soft. Bowel sounds are normal. She exhibits no distension. There is tenderness. There is no rebound and no guarding.   Musculoskeletal: Normal range of motion. She exhibits no edema.   Neurological: She is alert and oriented to person, place, and time.   Skin: Skin is warm and dry. Capillary refill takes less than 2 seconds.   Psychiatric: She has a normal mood and affect. Her behavior is normal. Judgment and thought content normal.   Nursing note and vitals reviewed.      Significant Labs:   CBC:   Recent Labs   Lab 04/07/19  0349 04/08/19  0416 04/08/19  0728   WBC 9.18 SEE COMMENT 8.43   HGB 11.5* SEE COMMENT 14.4   HCT 34.2* SEE COMMENT 41.9    SEE COMMENT 293     CMP:   Recent Labs   Lab 04/07/19  0349 04/08/19  0728    138   K 3.9 3.4*    104   CO2 25 26    82   BUN 7 5*   CREATININE 0.7 0.8   CALCIUM 8.5* 9.2   ANIONGAP 3* 8   EGFRNONAA >60.0 >60.0     POCT Glucose:   Recent Labs   Lab 04/08/19  0619   POCTGLUCOSE 85       Significant Imaging: I have reviewed all pertinent imaging results/findings within the past 24 hours.

## 2019-04-08 NOTE — PLAN OF CARE
Problem: Adult Inpatient Plan of Care  Goal: Plan of Care Review  Outcome: Ongoing (interventions implemented as appropriate)  No signs of distress noted this shift.  Pt tolerating IV fluids well this shift.  Pt tolerated IV meds this shift.  No complaints.  Treated pain with prn medication.  Will continue to monitor this shift.  Pt ready to be discharged so that she can catch her flight and go home.

## 2019-04-08 NOTE — CONSULTS
[facility]  Gastroenterology  Consult Note    Patient Name: Niru Phillips  MRN: [id number]  Admission Date: 4/6/2019  Hospital Length of Stay: 0 days  Code Status: Full Code   Attending Provider: Lu Salazar MD   Consulting Provider: Macrell Sauceda MD  Primary Care Physician: Jessica Rajan  Principal Problem:Intractable nausea and vomiting    Inpatient consult to Gastroenterology  Consult performed by: Marcell Sauceda MD  Consult ordered by: Marcell Sauceda MD        Subjective:     HPI:  18 year old female with no significant history currently admitted to medicine on who GI is being consulted with for intractable nausea and emesis.     Patient symptoms initially began in October at which point she noted bloating with meals. Then January after returning from a conference in [place] she was admitted to the hospital for ~1 week due to excessive bloating, nausea, and emesis. Remember taking Reglan which helped her symptoms. Between January to this current admission she continue to complain of bloating but the nausea/emesis did not recurrent until this current admission. On Thursday she had small bites of lobster, steak, and mash potatoes. She was in town for a wedding and to due nausea/emesis to an Uber to [facility] on Friday. She was discharged from the ED however returned Saturday with continued nausea/emesis. Since being here she initially improved but worsened once again after having mash potatoes. Today reports emesis almost right after having some broth/blue powerade.     Smokes cannabis 1/month. No recent travel or sick contacts.Current medications include OCP and MV. She lives at home with her parents, works 40 hrs/week, and goes to school.  Last she reports EGD ~ 2-3 weeks ago which showed a torturous duodenum with a concern for SMA syndrome, however normal CT (all back home, patient has picture of report). She also had an UGI-SBFT which was negative. Results of studies were  confirmed by her mom via phone today with her GI MD.    History reviewed. No pertinent past medical history.    History reviewed. No pertinent surgical history.    Review of patient's allergies indicates:  No Known Allergies  Family History     None        Tobacco Use    Smoking status: Never Smoker   Substance and Sexual Activity    Alcohol use: Yes     Comment: occasional     Drug use: Not on file    Sexual activity: Not on file     Review of Systems   Constitutional: Positive for activity change and appetite change. Negative for chills, diaphoresis, fatigue and fever.   HENT: Negative for trouble swallowing and voice change.    Eyes: Negative for photophobia, pain, discharge, redness, itching and visual disturbance.   Respiratory: Negative for cough and shortness of breath.    Cardiovascular: Negative for chest pain and palpitations.   Gastrointestinal: Positive for abdominal pain, diarrhea, nausea and vomiting. Negative for abdominal distention, anal bleeding, blood in stool, constipation and rectal pain.   Endocrine: Negative for cold intolerance, heat intolerance, polydipsia, polyphagia and polyuria.   Genitourinary: Negative for dysuria, frequency, hematuria and urgency.   Musculoskeletal: Negative for back pain and neck pain.   Skin: Negative for color change, pallor, rash and wound.   Neurological: Positive for weakness. Negative for dizziness, syncope and light-headedness.     Objective:     Vital Signs (Most Recent):  Temp: 97.7 °F (36.5 °C) (04/08/19 1151)  Pulse: 76 (04/08/19 1151)  Resp: 18 (04/08/19 1151)  BP: 132/86 (04/08/19 1151)  SpO2: 100 % (04/08/19 1151) Vital Signs (24h Range):  Temp:  [97.7 °F (36.5 °C)-99.4 °F (37.4 °C)] 97.7 °F (36.5 °C)  Pulse:  [72-96] 76  Resp:  [13-18] 18  SpO2:  [96 %-100 %] 100 %  BP: (107-155)/(64-94) 132/86     Weight: 53.3 kg (117 lb 8.1 oz) (04/06/19 2107)  Body mass index is 19.55 kg/m².      Intake/Output Summary (Last 24 hours) at 4/8/2019 2109  Last data  filed at 4/8/2019 0600  Gross per 24 hour   Intake 930 ml   Output --   Net 930 ml       Lines/Drains/Airways     Peripheral Intravenous Line                 Peripheral IV - Single Lumen 04/07/19 2053 Left Wrist less than 1 day                Physical Exam   Constitutional: She is oriented to person, place, and time. She appears well-developed and well-nourished.   HENT:   Head: Normocephalic and atraumatic.   Eyes: No scleral icterus.   Cardiovascular: Normal rate and regular rhythm.   Pulmonary/Chest: Effort normal and breath sounds normal.   Abdominal: Soft. Bowel sounds are normal.   Musculoskeletal: She exhibits no edema.   Neurological: She is alert and oriented to person, place, and time.   Nursing note and vitals reviewed.      Significant Labs:  CBC:   Recent Labs   Lab 04/07/19  0349 04/08/19  0416 04/08/19  0728   WBC 9.18 SEE COMMENT 8.43   HGB 11.5* SEE COMMENT 14.4   HCT 34.2* SEE COMMENT 41.9    SEE COMMENT 293     CMP:   Recent Labs   Lab 04/08/19  0728   GLU 82   CALCIUM 9.2      K 3.4*   CO2 26      BUN 5*   CREATININE 0.8     Coagulation: No results for input(s): PT, INR, APTT in the last 48 hours.    Significant Imaging:  Imaging results within the past 24 hours have been reviewed.    Assessment/Plan:     * Intractable nausea and vomiting  18 year old female with no significant history currently admitted to medicine on who GI is being consulted with for intractable nausea and emesis. Based on her pre-dominant complaint of excessive bloating we wonder if she could have SIBO less/unlikely to be cyclic vomiting vs cannabinoid hyperemesis syndrome vs rumination vs gastroparesis.    Recommendations:  --Advance diet as tolerate  --Daily labs and replace electrolytes as usual  --Continue Reglan  --Continue anti-emetics  --Continue PPI  --Start Elavil at 25 mg PO QDaily   --Start Rifaximin to complete a 14  --Plan discussed with parents who were on speaker phone  --Will need continued  follow up with GI back home        Thank you for your consult. I will follow-up with patient. Please contact us if you have any additional questions.    Marcell Sauceda M.D.  Gastroenterology Fellow, PGY-V  Pager: [phone]  [facility]

## 2019-04-09 VITALS
OXYGEN SATURATION: 100 % | TEMPERATURE: 98 F | SYSTOLIC BLOOD PRESSURE: 113 MMHG | DIASTOLIC BLOOD PRESSURE: 78 MMHG | RESPIRATION RATE: 18 BRPM | HEIGHT: 65 IN | WEIGHT: 117.5 LBS | BODY MASS INDEX: 19.58 KG/M2 | HEART RATE: 86 BPM

## 2019-04-09 LAB
AMPHET+METHAMPHET UR QL: NEGATIVE
ANION GAP SERPL CALC-SCNC: 11 MMOL/L (ref 8–16)
BARBITURATES UR QL SCN>200 NG/ML: NEGATIVE
BASOPHILS # BLD AUTO: 0.05 K/UL (ref 0–0.2)
BASOPHILS NFR BLD: 0.6 % (ref 0–1.9)
BENZODIAZ UR QL SCN>200 NG/ML: NEGATIVE
BUN SERPL-MCNC: 8 MG/DL (ref 6–20)
BZE UR QL SCN: NEGATIVE
CALCIUM SERPL-MCNC: 9.4 MG/DL (ref 8.7–10.5)
CANNABINOIDS UR QL SCN: NEGATIVE
CHLORIDE SERPL-SCNC: 103 MMOL/L (ref 95–110)
CO2 SERPL-SCNC: 23 MMOL/L (ref 23–29)
CREAT SERPL-MCNC: 0.8 MG/DL (ref 0.5–1.4)
CREAT UR-MCNC: 80 MG/DL (ref 15–325)
DIFFERENTIAL METHOD: ABNORMAL
EOSINOPHIL # BLD AUTO: 0 K/UL (ref 0–0.5)
EOSINOPHIL NFR BLD: 0.5 % (ref 0–8)
ERYTHROCYTE [DISTWIDTH] IN BLOOD BY AUTOMATED COUNT: 11.2 % (ref 11.5–14.5)
EST. GFR  (AFRICAN AMERICAN): >60 ML/MIN/1.73 M^2
EST. GFR  (NON AFRICAN AMERICAN): >60 ML/MIN/1.73 M^2
ETHANOL UR-MCNC: <10 MG/DL
GLUCOSE SERPL-MCNC: 78 MG/DL (ref 70–110)
HCT VFR BLD AUTO: 41.7 % (ref 37–48.5)
HGB BLD-MCNC: 14.7 G/DL (ref 12–16)
IMM GRANULOCYTES # BLD AUTO: 0.02 K/UL (ref 0–0.04)
IMM GRANULOCYTES NFR BLD AUTO: 0.2 % (ref 0–0.5)
LYMPHOCYTES # BLD AUTO: 3 K/UL (ref 1–4.8)
LYMPHOCYTES NFR BLD: 35.4 % (ref 18–48)
MAGNESIUM SERPL-MCNC: 2.1 MG/DL (ref 1.6–2.6)
MCH RBC QN AUTO: 31.3 PG (ref 27–31)
MCHC RBC AUTO-ENTMCNC: 35.3 G/DL (ref 32–36)
MCV RBC AUTO: 89 FL (ref 82–98)
METHADONE UR QL SCN>300 NG/ML: NEGATIVE
MONOCYTES # BLD AUTO: 0.7 K/UL (ref 0.3–1)
MONOCYTES NFR BLD: 8.4 % (ref 4–15)
NEUTROPHILS # BLD AUTO: 4.7 K/UL (ref 1.8–7.7)
NEUTROPHILS NFR BLD: 54.9 % (ref 38–73)
NRBC BLD-RTO: 0 /100 WBC
OPIATES UR QL SCN: NEGATIVE
PCP UR QL SCN>25 NG/ML: NEGATIVE
PHOSPHATE SERPL-MCNC: 4.5 MG/DL (ref 2.7–4.5)
PLATELET # BLD AUTO: 301 K/UL (ref 150–350)
PMV BLD AUTO: 10.1 FL (ref 9.2–12.9)
POTASSIUM SERPL-SCNC: 3.4 MMOL/L (ref 3.5–5.1)
RBC # BLD AUTO: 4.7 M/UL (ref 4–5.4)
SODIUM SERPL-SCNC: 137 MMOL/L (ref 136–145)
TOXICOLOGY INFORMATION: NORMAL
WBC # BLD AUTO: 8.56 K/UL (ref 3.9–12.7)

## 2019-04-09 PROCEDURE — 84100 ASSAY OF PHOSPHORUS: CPT

## 2019-04-09 PROCEDURE — 25000003 PHARM REV CODE 250: Performed by: PHYSICIAN ASSISTANT

## 2019-04-09 PROCEDURE — 63600175 PHARM REV CODE 636 W HCPCS: Performed by: PHYSICIAN ASSISTANT

## 2019-04-09 PROCEDURE — 83735 ASSAY OF MAGNESIUM: CPT

## 2019-04-09 PROCEDURE — 99217 PR OBSERVATION CARE DISCHARGE: CPT | Mod: ,,, | Performed by: NURSE PRACTITIONER

## 2019-04-09 PROCEDURE — 36415 COLL VENOUS BLD VENIPUNCTURE: CPT

## 2019-04-09 PROCEDURE — G0378 HOSPITAL OBSERVATION PER HR: HCPCS

## 2019-04-09 PROCEDURE — 85025 COMPLETE CBC W/AUTO DIFF WBC: CPT

## 2019-04-09 PROCEDURE — 80307 DRUG TEST PRSMV CHEM ANLYZR: CPT

## 2019-04-09 PROCEDURE — 99217 PR OBSERVATION CARE DISCHARGE: ICD-10-PCS | Mod: ,,, | Performed by: NURSE PRACTITIONER

## 2019-04-09 PROCEDURE — 80048 BASIC METABOLIC PNL TOTAL CA: CPT

## 2019-04-09 RX ORDER — AMITRIPTYLINE HYDROCHLORIDE 25 MG/1
25 TABLET, FILM COATED ORAL NIGHTLY
Qty: 30 TABLET | Refills: 1 | Status: SHIPPED | OUTPATIENT
Start: 2019-04-09 | End: 2020-04-08

## 2019-04-09 RX ORDER — PROMETHAZINE HYDROCHLORIDE 12.5 MG/1
12.5 TABLET ORAL 4 TIMES DAILY
Qty: 10 TABLET | Refills: 0 | Status: SHIPPED | OUTPATIENT
Start: 2019-04-09

## 2019-04-09 RX ADMIN — ONDANSETRON 4 MG: 2 INJECTION INTRAMUSCULAR; INTRAVENOUS at 09:04

## 2019-04-09 RX ADMIN — ONDANSETRON 4 MG: 2 INJECTION INTRAMUSCULAR; INTRAVENOUS at 03:04

## 2019-04-09 RX ADMIN — METOCLOPRAMIDE HYDROCHLORIDE 10 MG: 5 SOLUTION ORAL at 11:04

## 2019-04-09 RX ADMIN — PANTOPRAZOLE SODIUM 40 MG: 40 TABLET, DELAYED RELEASE ORAL at 09:04

## 2019-04-09 RX ADMIN — LIDOCAINE 1 PATCH: 50 PATCH TOPICAL at 09:04

## 2019-04-09 RX ADMIN — RIFAXIMIN 550 MG: 550 TABLET ORAL at 09:04

## 2019-04-09 RX ADMIN — PROMETHAZINE HYDROCHLORIDE 12.5 MG: 25 INJECTION INTRAMUSCULAR; INTRAVENOUS at 06:04

## 2019-04-09 RX ADMIN — METOCLOPRAMIDE HYDROCHLORIDE 10 MG: 5 SOLUTION ORAL at 06:04

## 2019-04-09 RX ADMIN — PROMETHAZINE HYDROCHLORIDE 12.5 MG: 25 INJECTION INTRAMUSCULAR; INTRAVENOUS at 12:04

## 2019-04-09 NOTE — PLAN OF CARE
04/09/2019      Niru Phillips  [address] [facility] [place] [address]          Hospital Medicine Dept.  [facility] [address] [place] [address]  [phone] after hours  [phone] fax Niru Phillips has been hospitalized at the [facility] since 4/6/2019.  Please excuse the patient from duties.  Patient may return on 04/12/2019.  No restrictions.     Please contact me if you have any questions.                   __________________________  Alyssa Hou NP  04/09/2019

## 2019-04-09 NOTE — PLAN OF CARE
Problem: Adult Inpatient Plan of Care  Goal: Plan of Care Review  Outcome: Ongoing (interventions implemented as appropriate)  Pt. Remains on fall precautions,bed low and locked,no falls sustained. No redness edema or drainage noted to skin. Continue current plan of care.

## 2019-04-09 NOTE — PLAN OF CARE
Problem: Adult Inpatient Plan of Care  Goal: Plan of Care Review  Outcome: Ongoing (interventions implemented as appropriate)  Patient lying in bed, family at the bedside. aao x4. C/o mild discomfort to abd. No distress noted. Safety maintained.

## 2019-04-09 NOTE — DISCHARGE INSTRUCTIONS
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

## 2019-04-09 NOTE — PLAN OF CARE
Patient discharged to fly home to [place] with parents.       04/09/19 1613   Final Note   Assessment Type Final Discharge Note   Anticipated Discharge Disposition Home   Right Care Referral Info   Post Acute Recommendation No Care

## 2019-04-10 NOTE — DISCHARGE SUMMARY
[facility] Hospital Medicine  Discharge Summary      Patient Name: Niru Phillips  MRN: [id number]  Admission Date: 4/6/2019  Hospital Length of Stay: 0 days  Discharge Date and Time:  04/09/2019 3:23 PM  Attending Physician: No att. providers found   Discharging Provider: Alyssa Hou NP  Primary Care Provider: [facility] Medicine Team: [facility] F Alyssa Hou NP    HPI:   Niru Phillips is a 18F with no past medical history who presents for evaluation of abdominal pain with nausea and vomiting. Her symptoms started in October with extensive bloating and abdominal pain after meals. No identifiable food triggers. She has tried various diet modifications without any change in symptoms. Sometimes improvement with TUMS, other times not. She can usually tolerate smoothies and liquids if she has them on their own. She has no trouble swallowing and can keep food down for about 10-15 minutes, then develops extreme abdominal distension.     She is in town from [place] for a wedding. She has had GI work in [place] for these symptoms without clear etiology. She was admitted for about a week in [place] where she was discharged with Protonix, Reglan, famotidine, and amitriptyline. She has not taken any of these medications since her discharge. She says her symptoms improved the most with reglan while in the hospital, but stopped because she was told it was not a long-term medications. She has had an EGD, CT, US, UGI w/ SBFT, celiac panel which were all negative per patient. She does smoke MJ once a month, last time she smoked was 3 weeks ago, no exacerbation of symptoms after last smoking. Denies fever, SOB, CP, HA, vision changes, hematemesis, diarrhea (but did have 1 episode of loose stools, that has resolved), neck stiffness, URI symptoms, sick contacts, urinary symptoms, vaginal bleeding, recent travel, odd foods, antibiotics, increased stress in her personal life. She  "does report some weight loss because of these problems.    The patient was able to find a photo of her EGD report which made mention of anatomy concerning for "SMA syndrome", but otherwise negative. SMA syndrome is a rare condition that occurs when the duodenum is compressed between the aorta and the superior mesenteric artery causing partial or complete blockage of the duodenum and often seen in thin patients with weight loss who lose the fat pad that usually maintains this gap.    She is tolerating broth and sprite during admission.    ED: CBC 14, K 3.3, ESR WNL, CRP WNL, UA negative, UPT negative, UDS negative, US abd negative.    * No surgery found *      Hospital Course:   Patient was admitted to observation for intractable abdominal pain, nausea and vomiting. Patient given IVF, and anti-emetics with good relief. GI consulted and recommend starting elavil, PPI, Rifaxzimin for 14 days; and to follow up with GI in [place].  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, however, there is limited literature about this rare condition  - for now supportive care with reglan, repositioning after meals (prone or knee-chest position or by lying on the left side),   - patient wishes to get well enough to return to [place] for further management, but if no improvement may need to consult GI while here  - RUQ abdominal US unremarkable, stomach distended with fluid and scattered echogenic debris which is nonspecific  - ESR/ CRP WNL  - afebrile, mild leukocytosis (likely reactionary) resolved  - patient with emesis, abdominal pain, and 1 " episode of diarrhea after eating broth, family now wishes for further work up here  - No further work up of diarrhea at this time as only 1 episode, if more will get testing  - scheduled zofran/ phenergan, reglan   - clear liquid diet  - IVF  - QTc 416  GI consulted and recommend starting elavil, PPI, RIfaxzimin for 14 days; and to follow up with GI in [place].  Patient able to tolerate PO intake prior to discharge    Hypokalemia  - replaced per IV      Final Active Diagnoses:    Diagnosis Date Noted POA    PRINCIPAL PROBLEM:  Intractable nausea and vomiting [R11.2] 04/06/2019 Yes    Hypokalemia [E87.6] 04/06/2019 Yes      Problems Resolved During this Admission:       Discharged Condition: good    Disposition: Home or Self Care    Follow Up: GI in [place]    Patient Instructions:      Diet Adult Regular     Diet clear liquid     Notify your health care provider if you experience any of the following:  persistent nausea and vomiting or diarrhea     Notify your health care provider if you experience any of the following:  severe uncontrolled pain     Notify your health care provider if you experience any of the following:  temperature >100.4     Notify your health care provider if you experience any of the following:  persistent dizziness, light-headedness, or visual disturbances     Notify your health care provider if you experience any of the following:  increased confusion or weakness     Activity as tolerated     Activity as tolerated     Activity as tolerated       Significant Diagnostic Studies: All reviewed prior to discharge    Pending Diagnostic Studies:     None         Medications:  Reconciled Home Medications:      Medication List      START taking these medications    amitriptyline 25 MG tablet  Commonly known as:  ELAVIL  Take 1 tablet (25 mg total) by mouth every evening.     pantoprazole 40 MG tablet  Commonly known as:  PROTONIX  Take 1 tablet (40 mg total) by mouth once daily.     promethazine  12.5 MG Tab  Commonly known as:  PHENERGAN  Take 1 tablet (12.5 mg total) by mouth 4 (four) times daily.  Replaces:  promethazine 25 MG suppository     XIFAXAN 550 mg Tab  Generic drug:  rifAXIMin  Take 1 tablet (550 mg total) by mouth 3 (three) times daily. for 13 days        CHANGE how you take these medications    * ondansetron 4 MG Tbdl  Commonly known as:  ZOFRAN-ODT  Take 1 tablet (4 mg total) by mouth every 6 (six) hours as needed (for nausea and vomiting).  What changed:  Another medication with the same name was added. Make sure you understand how and when to take each.     * ondansetron 8 MG Tbdl  Commonly known as:  ZOFRAN-ODT  Dissolve 1 tablet (8 mg total) by mouth every 6 (six) hours as needed.  What changed:  You were already taking a medication with the same name, and this prescription was added. Make sure you understand how and when to take each.         * This list has 2 medication(s) that are the same as other medications prescribed for you. Read the directions carefully, and ask your doctor or other care provider to review them with you.            CONTINUE taking these medications    MICROGESTIN 1/20 (21) ORAL  Take 1 tablet by mouth once daily.        STOP taking these medications    promethazine 25 MG suppository  Commonly known as:  PHENERGAN  Replaced by:  promethazine 12.5 MG Tab            Indwelling Lines/Drains at time of discharge:   Lines/Drains/Airways          None          Time spent on the discharge of patient: >35 minutes  Patient was seen and examined on the date of discharge and determined to be suitable for discharge.         Alyssa Hou NP  Department of Hospital Medicine  [facility]

## 2022-04-21 NOTE — ASSESSMENT & PLAN NOTE
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, however, there is limited literature about this rare condition  - for now supportive care with reglan, repositioning after meals (prone or knee-chest position or by lying on the left side),   - patient wishes to get well enough to return to [place] for further management, but if no improvement may need to consult GI while here  - RUQ abdominal US unremarkable, stomach distended with fluid and scattered echogenic debris which is nonspecific  - ESR/ CRP WNL  - afebrile, mild leukocytosis (likely reactionary) resolved  - patient with emesis, abdominal pain, and 1 episode of diarrhea after eating broth, family now wishes for further work up here  - No further work up of diarrhea at this time as only 1 episode, if more will get testing  - scheduled zofran/ phenergan, reglan   - clear liquid diet  - IVF  - QTc 416  GI consulted and recommend starting elavil, PPI, RIfaxzimin for 14 days; and to follow up with GI in [place].  Patient able to tolerate PO intake prior to discharge  
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, however, there is limited literature about this rare condition  - for now supportive care with reglan, repositioning after meals (prone or knee-chest position or by lying on the left side),   - patient wishes to get well enough to return to [place] for further management, but if no improvement may need to consult GI while here  - RUQ abdominal US unremarkable, stomach distended with fluid and scattered echogenic debris which is nonspecific  - ESR/ CRP WNL  - afebrile, mild leukocytosis (likely reactionary) resolved  - GI consulted, appreciate recs  - patient with emesis, abdominal pain, and 1 episode of diarrhea after eating broth, family now wishes for further work up here  - No further work up of diarrhea at this time as only 1 episode, if more will get testing  - scheduled zofran/ phenergan, reglan   - clear liquid diet  - IVF  - QTc 416  
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  
- replace per IV  
- replaced per IV  
18 year old female with no significant history currently admitted to medicine on who GI is being consulted with for intractable nausea and emesis. Based on her pre-dominant complaint of excessive bloating we wonder if she could have SIBO less/unlikely to be cyclic vomiting vs cannabinoid hyperemesis syndrome vs rumination vs gastroparesis.    Recommendations:  --Advance diet as tolerate  --Daily labs and replace electrolytes as usual  --Continue Reglan  --Continue anti-emetics  --Continue PPI  --Start Elavil at 25 mg PO QDaily   --Start Rifaximin to complete a 14  --Plan discussed with parents who were on speaker phone  --Will need continued follow up with GI back home  
Stroke TRx

## 2023-12-18 NOTE — ED NOTES
Pt given PO fluids and crackers and potassium pills for PO challenge. Will continue to monitor   Detail Level: Detailed Quality 137: Melanoma: Continuity Of Care - Recall System: Patient information entered into a recall system that includes: target date for the next exam specified AND a process to follow up with patients regarding missed or unscheduled appointments Detail Level: Generalized